# Patient Record
Sex: FEMALE | Race: WHITE | NOT HISPANIC OR LATINO
[De-identification: names, ages, dates, MRNs, and addresses within clinical notes are randomized per-mention and may not be internally consistent; named-entity substitution may affect disease eponyms.]

---

## 2017-06-08 ENCOUNTER — HOSPITAL ENCOUNTER (OUTPATIENT)
Dept: LAB | Age: 77
Setting detail: SPECIMEN
Discharge: HOME | End: 2017-06-08

## 2017-06-08 ENCOUNTER — RECORDS - HEALTHEAST (OUTPATIENT)
Dept: ADMINISTRATIVE | Facility: OTHER | Age: 77
End: 2017-06-08

## 2017-06-08 ENCOUNTER — RECORDS - HEALTHEAST (OUTPATIENT)
Dept: LAB | Facility: CLINIC | Age: 77
End: 2017-06-08

## 2017-06-08 LAB — CEA SERPL-MCNC: 4.4 NG/ML (ref 0–3)

## 2017-06-09 ENCOUNTER — COMMUNICATION - HEALTHEAST (OUTPATIENT)
Dept: ONCOLOGY | Facility: HOSPITAL | Age: 77
End: 2017-06-09

## 2018-01-29 ENCOUNTER — RECORDS - HEALTHEAST (OUTPATIENT)
Dept: LAB | Facility: CLINIC | Age: 78
End: 2018-01-29

## 2018-01-30 LAB
ALBUMIN SERPL-MCNC: 3.5 G/DL (ref 3.5–5)
ALP SERPL-CCNC: 106 U/L (ref 45–120)
ALT SERPL W P-5'-P-CCNC: 11 U/L (ref 0–45)
ANION GAP SERPL CALCULATED.3IONS-SCNC: 14 MMOL/L (ref 5–18)
AST SERPL W P-5'-P-CCNC: 12 U/L (ref 0–40)
BILIRUB SERPL-MCNC: 0.2 MG/DL (ref 0–1)
BUN SERPL-MCNC: 23 MG/DL (ref 8–28)
CALCIUM SERPL-MCNC: 9 MG/DL (ref 8.5–10.5)
CHLORIDE BLD-SCNC: 105 MMOL/L (ref 98–107)
CHOLEST SERPL-MCNC: 203 MG/DL
CO2 SERPL-SCNC: 19 MMOL/L (ref 22–31)
CREAT SERPL-MCNC: 1.41 MG/DL (ref 0.6–1.1)
FASTING STATUS PATIENT QL REPORTED: ABNORMAL
GFR SERPL CREATININE-BSD FRML MDRD: 36 ML/MIN/1.73M2
GLUCOSE BLD-MCNC: 202 MG/DL (ref 70–125)
HDLC SERPL-MCNC: 54 MG/DL
LDLC SERPL CALC-MCNC: 92 MG/DL
POTASSIUM BLD-SCNC: 4 MMOL/L (ref 3.5–5)
PROT SERPL-MCNC: 7.6 G/DL (ref 6–8)
SODIUM SERPL-SCNC: 138 MMOL/L (ref 136–145)
TRIGL SERPL-MCNC: 287 MG/DL
VIT B12 SERPL-MCNC: 645 PG/ML (ref 213–816)

## 2018-07-05 ENCOUNTER — RECORDS - HEALTHEAST (OUTPATIENT)
Dept: LAB | Facility: CLINIC | Age: 78
End: 2018-07-05

## 2018-07-05 LAB
ALBUMIN SERPL-MCNC: 3.6 G/DL (ref 3.5–5)
ALP SERPL-CCNC: 100 U/L (ref 45–120)
ALT SERPL W P-5'-P-CCNC: <9 U/L (ref 0–45)
ANION GAP SERPL CALCULATED.3IONS-SCNC: 13 MMOL/L (ref 5–18)
AST SERPL W P-5'-P-CCNC: 13 U/L (ref 0–40)
BILIRUB SERPL-MCNC: 0.4 MG/DL (ref 0–1)
BUN SERPL-MCNC: 24 MG/DL (ref 8–28)
CALCIUM SERPL-MCNC: 9.4 MG/DL (ref 8.5–10.5)
CHLORIDE BLD-SCNC: 108 MMOL/L (ref 98–107)
CO2 SERPL-SCNC: 19 MMOL/L (ref 22–31)
CREAT SERPL-MCNC: 1.32 MG/DL (ref 0.6–1.1)
GFR SERPL CREATININE-BSD FRML MDRD: 39 ML/MIN/1.73M2
GLUCOSE BLD-MCNC: 158 MG/DL (ref 70–125)
POTASSIUM BLD-SCNC: 4.8 MMOL/L (ref 3.5–5)
PROT SERPL-MCNC: 7.3 G/DL (ref 6–8)
SODIUM SERPL-SCNC: 140 MMOL/L (ref 136–145)
TSH SERPL DL<=0.005 MIU/L-ACNC: 2.07 UIU/ML (ref 0.3–5)

## 2018-07-09 ENCOUNTER — RECORDS - HEALTHEAST (OUTPATIENT)
Dept: LAB | Facility: CLINIC | Age: 78
End: 2018-07-09

## 2018-07-10 ENCOUNTER — AMBULATORY - HEALTHEAST (OUTPATIENT)
Dept: ONCOLOGY | Facility: HOSPITAL | Age: 78
End: 2018-07-10

## 2018-07-10 ENCOUNTER — COMMUNICATION - HEALTHEAST (OUTPATIENT)
Dept: ONCOLOGY | Facility: HOSPITAL | Age: 78
End: 2018-07-10

## 2018-07-10 DIAGNOSIS — C18.9 COLON CANCER (H): ICD-10-CM

## 2018-07-10 LAB
BASOPHILS # BLD AUTO: 0 THOU/UL (ref 0–0.2)
BASOPHILS NFR BLD AUTO: 0 % (ref 0–2)
EOSINOPHIL # BLD AUTO: 0 THOU/UL (ref 0–0.4)
EOSINOPHIL NFR BLD AUTO: 0 % (ref 0–6)
ERYTHROCYTE [DISTWIDTH] IN BLOOD BY AUTOMATED COUNT: 13.2 % (ref 11–14.5)
HCT VFR BLD AUTO: 34.8 % (ref 35–47)
HGB BLD-MCNC: 11.1 G/DL (ref 12–16)
LYMPHOCYTES # BLD AUTO: 1.3 THOU/UL (ref 0.8–4.4)
LYMPHOCYTES NFR BLD AUTO: 7 % (ref 20–40)
MCH RBC QN AUTO: 31 PG (ref 27–34)
MCHC RBC AUTO-ENTMCNC: 31.9 G/DL (ref 32–36)
MCV RBC AUTO: 97 FL (ref 80–100)
MONOCYTES # BLD AUTO: 0.5 THOU/UL (ref 0–0.9)
MONOCYTES NFR BLD AUTO: 2 % (ref 2–10)
NEUTROPHILS # BLD AUTO: 17.5 THOU/UL (ref 2–7.7)
NEUTROPHILS NFR BLD AUTO: 91 % (ref 50–70)
PLATELET # BLD AUTO: 250 THOU/UL (ref 140–440)
PMV BLD AUTO: 12.4 FL (ref 8.5–12.5)
RBC # BLD AUTO: 3.58 MILL/UL (ref 3.8–5.4)
WBC: 19.4 THOU/UL (ref 4–11)

## 2018-07-24 ENCOUNTER — RECORDS - HEALTHEAST (OUTPATIENT)
Dept: LAB | Facility: CLINIC | Age: 78
End: 2018-07-24

## 2018-07-25 LAB
IRON SATN MFR SERPL: 18 % (ref 20–50)
IRON SERPL-MCNC: 54 UG/DL (ref 42–175)
TIBC SERPL-MCNC: 304 UG/DL (ref 313–563)
TRANSFERRIN SERPL-MCNC: 243 MG/DL (ref 212–360)

## 2018-07-26 LAB
BASOPHILS # BLD AUTO: 0 THOU/UL (ref 0–0.2)
BASOPHILS NFR BLD AUTO: 0 % (ref 0–2)
EOSINOPHIL COUNT (ABSOLUTE): 0 THOU/UL (ref 0–0.4)
EOSINOPHIL NFR BLD AUTO: 0 % (ref 0–6)
ERYTHROCYTE [DISTWIDTH] IN BLOOD BY AUTOMATED COUNT: 13.7 % (ref 11–14.5)
HCT VFR BLD AUTO: 36.6 % (ref 35–47)
HGB BLD-MCNC: 11.1 G/DL (ref 12–16)
LAB AP CHARGES (HE HISTORICAL CONVERSION): NORMAL
LYMPHOCYTES # BLD AUTO: 1.9 THOU/UL (ref 0.8–4.4)
LYMPHOCYTES NFR BLD AUTO: 8 % (ref 20–40)
MCH RBC QN AUTO: 30.3 PG (ref 27–34)
MCHC RBC AUTO-ENTMCNC: 30.3 G/DL (ref 32–36)
MCV RBC AUTO: 100 FL (ref 80–100)
MONOCYTES # BLD AUTO: 1.2 THOU/UL (ref 0–0.9)
MONOCYTES NFR BLD AUTO: 5 % (ref 2–10)
PATH REPORT.COMMENTS IMP SPEC: NORMAL
PATH REPORT.COMMENTS IMP SPEC: NORMAL
PATH REPORT.FINAL DX SPEC: NORMAL
PATH REPORT.MICROSCOPIC SPEC OTHER STN: ABNORMAL
PATH REPORT.MICROSCOPIC SPEC OTHER STN: NORMAL
PATH REPORT.RELEVANT HX SPEC: NORMAL
PLAT MORPH BLD: NORMAL
PLATELET # BLD AUTO: 262 THOU/UL (ref 140–440)
PMV BLD AUTO: 11.8 FL (ref 8.5–12.5)
RBC # BLD AUTO: 3.66 MILL/UL (ref 3.8–5.4)
TOTAL NEUTROPHILS-ABS(DIFF): 20.8 THOU/UL (ref 2–7.7)
TOTAL NEUTROPHILS-REL(DIFF): 87 % (ref 50–70)
WBC: 23.9 THOU/UL (ref 4–11)

## 2018-08-03 ENCOUNTER — RECORDS - HEALTHEAST (OUTPATIENT)
Dept: ADMINISTRATIVE | Facility: OTHER | Age: 78
End: 2018-08-03

## 2018-10-25 ENCOUNTER — OFFICE VISIT - HEALTHEAST (OUTPATIENT)
Dept: GERIATRICS | Facility: CLINIC | Age: 78
End: 2018-10-25

## 2018-10-25 DIAGNOSIS — C18.9 MALIGNANT NEOPLASM OF COLON, UNSPECIFIED PART OF COLON (H): ICD-10-CM

## 2018-10-25 DIAGNOSIS — E08.00 DIABETES MELLITUS DUE TO UNDERLYING CONDITION WITH HYPEROSMOLARITY WITHOUT COMA, WITH LONG-TERM CURRENT USE OF INSULIN (H): ICD-10-CM

## 2018-10-25 DIAGNOSIS — R47.01 EXPRESSIVE APHASIA: ICD-10-CM

## 2018-10-25 DIAGNOSIS — D86.0 SARCOIDOSIS OF LUNG (H): ICD-10-CM

## 2018-10-25 DIAGNOSIS — N39.0 UTI (URINARY TRACT INFECTION), BACTERIAL: ICD-10-CM

## 2018-10-25 DIAGNOSIS — I10 ESSENTIAL HYPERTENSION: ICD-10-CM

## 2018-10-25 DIAGNOSIS — A49.9 UTI (URINARY TRACT INFECTION), BACTERIAL: ICD-10-CM

## 2018-10-25 DIAGNOSIS — Z79.4 DIABETES MELLITUS DUE TO UNDERLYING CONDITION WITH HYPEROSMOLARITY WITHOUT COMA, WITH LONG-TERM CURRENT USE OF INSULIN (H): ICD-10-CM

## 2018-10-25 DIAGNOSIS — E83.42 HYPOMAGNESEMIA: ICD-10-CM

## 2018-10-25 DIAGNOSIS — N17.9 AKI (ACUTE KIDNEY INJURY) (H): ICD-10-CM

## 2018-10-26 ENCOUNTER — RECORDS - HEALTHEAST (OUTPATIENT)
Dept: LAB | Facility: CLINIC | Age: 78
End: 2018-10-26

## 2018-10-26 LAB
ALBUMIN SERPL-MCNC: 2.8 G/DL (ref 3.5–5)
ALP SERPL-CCNC: 88 U/L (ref 45–120)
ALT SERPL W P-5'-P-CCNC: <9 U/L (ref 0–45)
ANION GAP SERPL CALCULATED.3IONS-SCNC: 8 MMOL/L (ref 5–18)
AST SERPL W P-5'-P-CCNC: 11 U/L (ref 0–40)
BILIRUB SERPL-MCNC: 0.4 MG/DL (ref 0–1)
BUN SERPL-MCNC: 16 MG/DL (ref 8–28)
CALCIUM SERPL-MCNC: 8.9 MG/DL (ref 8.5–10.5)
CHLORIDE BLD-SCNC: 104 MMOL/L (ref 98–107)
CO2 SERPL-SCNC: 27 MMOL/L (ref 22–31)
CREAT SERPL-MCNC: 1.03 MG/DL (ref 0.6–1.1)
GFR SERPL CREATININE-BSD FRML MDRD: 52 ML/MIN/1.73M2
GLUCOSE BLD-MCNC: 114 MG/DL (ref 70–125)
HGB BLD-MCNC: 9.2 G/DL (ref 12–16)
POTASSIUM BLD-SCNC: 3.8 MMOL/L (ref 3.5–5)
PROT SERPL-MCNC: 6.7 G/DL (ref 6–8)
SODIUM SERPL-SCNC: 139 MMOL/L (ref 136–145)

## 2018-10-30 ENCOUNTER — OFFICE VISIT - HEALTHEAST (OUTPATIENT)
Dept: GERIATRICS | Facility: CLINIC | Age: 78
End: 2018-10-30

## 2018-10-30 DIAGNOSIS — E83.42 HYPOMAGNESEMIA: ICD-10-CM

## 2018-10-30 DIAGNOSIS — R47.01 EXPRESSIVE APHASIA: ICD-10-CM

## 2018-10-30 DIAGNOSIS — I10 ESSENTIAL HYPERTENSION: ICD-10-CM

## 2018-10-30 DIAGNOSIS — D86.0 SARCOIDOSIS OF LUNG (H): ICD-10-CM

## 2018-10-30 DIAGNOSIS — A49.9 UTI (URINARY TRACT INFECTION), BACTERIAL: ICD-10-CM

## 2018-10-30 DIAGNOSIS — N39.0 UTI (URINARY TRACT INFECTION), BACTERIAL: ICD-10-CM

## 2018-10-30 DIAGNOSIS — Z79.4 DIABETES MELLITUS DUE TO UNDERLYING CONDITION WITH HYPEROSMOLARITY WITHOUT COMA, WITH LONG-TERM CURRENT USE OF INSULIN (H): ICD-10-CM

## 2018-10-30 DIAGNOSIS — N17.9 AKI (ACUTE KIDNEY INJURY) (H): ICD-10-CM

## 2018-10-30 DIAGNOSIS — C18.9 MALIGNANT NEOPLASM OF COLON, UNSPECIFIED PART OF COLON (H): ICD-10-CM

## 2018-10-30 DIAGNOSIS — E08.00 DIABETES MELLITUS DUE TO UNDERLYING CONDITION WITH HYPEROSMOLARITY WITHOUT COMA, WITH LONG-TERM CURRENT USE OF INSULIN (H): ICD-10-CM

## 2018-10-31 ENCOUNTER — HOME CARE/HOSPICE - HEALTHEAST (OUTPATIENT)
Dept: HOME HEALTH SERVICES | Facility: HOME HEALTH | Age: 78
End: 2018-10-31

## 2018-11-01 ENCOUNTER — OFFICE VISIT - HEALTHEAST (OUTPATIENT)
Dept: GERIATRICS | Facility: CLINIC | Age: 78
End: 2018-11-01

## 2018-11-01 DIAGNOSIS — I10 ESSENTIAL HYPERTENSION: ICD-10-CM

## 2018-11-01 DIAGNOSIS — A49.9 UTI (URINARY TRACT INFECTION), BACTERIAL: ICD-10-CM

## 2018-11-01 DIAGNOSIS — N39.0 UTI (URINARY TRACT INFECTION), BACTERIAL: ICD-10-CM

## 2018-11-01 DIAGNOSIS — Z79.4 DIABETES MELLITUS DUE TO UNDERLYING CONDITION WITH HYPEROSMOLARITY WITHOUT COMA, WITH LONG-TERM CURRENT USE OF INSULIN (H): ICD-10-CM

## 2018-11-01 DIAGNOSIS — R47.01 EXPRESSIVE APHASIA: ICD-10-CM

## 2018-11-01 DIAGNOSIS — E08.00 DIABETES MELLITUS DUE TO UNDERLYING CONDITION WITH HYPEROSMOLARITY WITHOUT COMA, WITH LONG-TERM CURRENT USE OF INSULIN (H): ICD-10-CM

## 2018-11-01 DIAGNOSIS — N17.9 AKI (ACUTE KIDNEY INJURY) (H): ICD-10-CM

## 2018-11-06 ENCOUNTER — OFFICE VISIT - HEALTHEAST (OUTPATIENT)
Dept: GERIATRICS | Facility: CLINIC | Age: 78
End: 2018-11-06

## 2018-11-06 DIAGNOSIS — I10 ESSENTIAL HYPERTENSION: ICD-10-CM

## 2018-11-06 DIAGNOSIS — Z79.4 DIABETES MELLITUS DUE TO UNDERLYING CONDITION WITH HYPEROSMOLARITY WITHOUT COMA, WITH LONG-TERM CURRENT USE OF INSULIN (H): ICD-10-CM

## 2018-11-06 DIAGNOSIS — E08.00 DIABETES MELLITUS DUE TO UNDERLYING CONDITION WITH HYPEROSMOLARITY WITHOUT COMA, WITH LONG-TERM CURRENT USE OF INSULIN (H): ICD-10-CM

## 2018-11-06 DIAGNOSIS — I63.9 CEREBELLAR INFARCT (H): ICD-10-CM

## 2018-11-06 DIAGNOSIS — R47.01 EXPRESSIVE APHASIA: ICD-10-CM

## 2018-11-06 DIAGNOSIS — N39.0 UTI (URINARY TRACT INFECTION), BACTERIAL: ICD-10-CM

## 2018-11-06 DIAGNOSIS — A49.9 UTI (URINARY TRACT INFECTION), BACTERIAL: ICD-10-CM

## 2018-11-08 ENCOUNTER — OFFICE VISIT - HEALTHEAST (OUTPATIENT)
Dept: GERIATRICS | Facility: CLINIC | Age: 78
End: 2018-11-08

## 2018-11-08 DIAGNOSIS — R47.01 EXPRESSIVE APHASIA: ICD-10-CM

## 2018-11-08 DIAGNOSIS — E08.00 DIABETES MELLITUS DUE TO UNDERLYING CONDITION WITH HYPEROSMOLARITY WITHOUT COMA, WITH LONG-TERM CURRENT USE OF INSULIN (H): ICD-10-CM

## 2018-11-08 DIAGNOSIS — I10 ESSENTIAL HYPERTENSION: ICD-10-CM

## 2018-11-08 DIAGNOSIS — Z79.4 DIABETES MELLITUS DUE TO UNDERLYING CONDITION WITH HYPEROSMOLARITY WITHOUT COMA, WITH LONG-TERM CURRENT USE OF INSULIN (H): ICD-10-CM

## 2018-11-08 DIAGNOSIS — A49.9 UTI (URINARY TRACT INFECTION), BACTERIAL: ICD-10-CM

## 2018-11-08 DIAGNOSIS — N39.0 UTI (URINARY TRACT INFECTION), BACTERIAL: ICD-10-CM

## 2018-11-13 ENCOUNTER — OFFICE VISIT - HEALTHEAST (OUTPATIENT)
Dept: GERIATRICS | Facility: CLINIC | Age: 78
End: 2018-11-13

## 2018-11-13 DIAGNOSIS — I63.9 CEREBELLAR INFARCT (H): ICD-10-CM

## 2018-11-13 DIAGNOSIS — N17.9 AKI (ACUTE KIDNEY INJURY) (H): ICD-10-CM

## 2018-11-13 DIAGNOSIS — R47.01 EXPRESSIVE APHASIA: ICD-10-CM

## 2018-11-13 DIAGNOSIS — A49.9 UTI (URINARY TRACT INFECTION), BACTERIAL: ICD-10-CM

## 2018-11-13 DIAGNOSIS — N39.0 UTI (URINARY TRACT INFECTION), BACTERIAL: ICD-10-CM

## 2018-11-13 DIAGNOSIS — Z79.4 DIABETES MELLITUS DUE TO UNDERLYING CONDITION WITH HYPEROSMOLARITY WITHOUT COMA, WITH LONG-TERM CURRENT USE OF INSULIN (H): ICD-10-CM

## 2018-11-13 DIAGNOSIS — E08.00 DIABETES MELLITUS DUE TO UNDERLYING CONDITION WITH HYPEROSMOLARITY WITHOUT COMA, WITH LONG-TERM CURRENT USE OF INSULIN (H): ICD-10-CM

## 2018-11-13 DIAGNOSIS — D86.0 SARCOIDOSIS OF LUNG (H): ICD-10-CM

## 2018-11-15 ENCOUNTER — OFFICE VISIT - HEALTHEAST (OUTPATIENT)
Dept: GERIATRICS | Facility: CLINIC | Age: 78
End: 2018-11-15

## 2018-11-15 DIAGNOSIS — I63.9 CEREBELLAR INFARCT (H): ICD-10-CM

## 2018-11-15 DIAGNOSIS — N39.0 UTI (URINARY TRACT INFECTION), BACTERIAL: ICD-10-CM

## 2018-11-15 DIAGNOSIS — D86.0 SARCOIDOSIS OF LUNG (H): ICD-10-CM

## 2018-11-15 DIAGNOSIS — Z79.4 DIABETES MELLITUS DUE TO UNDERLYING CONDITION WITH HYPEROSMOLARITY WITHOUT COMA, WITH LONG-TERM CURRENT USE OF INSULIN (H): ICD-10-CM

## 2018-11-15 DIAGNOSIS — R47.01 EXPRESSIVE APHASIA: ICD-10-CM

## 2018-11-15 DIAGNOSIS — I10 ESSENTIAL HYPERTENSION: ICD-10-CM

## 2018-11-15 DIAGNOSIS — A49.9 UTI (URINARY TRACT INFECTION), BACTERIAL: ICD-10-CM

## 2018-11-15 DIAGNOSIS — E08.00 DIABETES MELLITUS DUE TO UNDERLYING CONDITION WITH HYPEROSMOLARITY WITHOUT COMA, WITH LONG-TERM CURRENT USE OF INSULIN (H): ICD-10-CM

## 2018-11-16 ENCOUNTER — AMBULATORY - HEALTHEAST (OUTPATIENT)
Dept: GERIATRICS | Facility: CLINIC | Age: 78
End: 2018-11-16

## 2018-11-19 ENCOUNTER — RECORDS - HEALTHEAST (OUTPATIENT)
Dept: LAB | Facility: CLINIC | Age: 78
End: 2018-11-19

## 2018-11-19 LAB
ANION GAP SERPL CALCULATED.3IONS-SCNC: 16 MMOL/L (ref 5–18)
BUN SERPL-MCNC: 21 MG/DL (ref 8–28)
CALCIUM SERPL-MCNC: 9 MG/DL (ref 8.5–10.5)
CHLORIDE BLD-SCNC: 105 MMOL/L (ref 98–107)
CO2 SERPL-SCNC: 21 MMOL/L (ref 22–31)
CREAT SERPL-MCNC: 1.31 MG/DL (ref 0.6–1.1)
GFR SERPL CREATININE-BSD FRML MDRD: 39 ML/MIN/1.73M2
GLUCOSE BLD-MCNC: 167 MG/DL (ref 70–125)
MAGNESIUM SERPL-MCNC: 1.8 MG/DL (ref 1.8–2.6)
POTASSIUM BLD-SCNC: 3.9 MMOL/L (ref 3.5–5)
SODIUM SERPL-SCNC: 142 MMOL/L (ref 136–145)

## 2018-12-10 ENCOUNTER — RECORDS - HEALTHEAST (OUTPATIENT)
Dept: LAB | Facility: CLINIC | Age: 78
End: 2018-12-10

## 2018-12-11 LAB — BACTERIA SPEC CULT: NO GROWTH

## 2019-01-07 ENCOUNTER — RECORDS - HEALTHEAST (OUTPATIENT)
Dept: LAB | Facility: CLINIC | Age: 79
End: 2019-01-07

## 2019-01-07 LAB
ANION GAP SERPL CALCULATED.3IONS-SCNC: 16 MMOL/L (ref 5–18)
BUN SERPL-MCNC: 19 MG/DL (ref 8–28)
CALCIUM SERPL-MCNC: 9.1 MG/DL (ref 8.5–10.5)
CHLORIDE BLD-SCNC: 103 MMOL/L (ref 98–107)
CO2 SERPL-SCNC: 19 MMOL/L (ref 22–31)
CREAT SERPL-MCNC: 1.5 MG/DL (ref 0.6–1.1)
GFR SERPL CREATININE-BSD FRML MDRD: 34 ML/MIN/1.73M2
GLUCOSE BLD-MCNC: 186 MG/DL (ref 70–125)
POTASSIUM BLD-SCNC: 4.6 MMOL/L (ref 3.5–5)
SODIUM SERPL-SCNC: 138 MMOL/L (ref 136–145)

## 2019-04-17 ENCOUNTER — RECORDS - HEALTHEAST (OUTPATIENT)
Dept: LAB | Facility: CLINIC | Age: 79
End: 2019-04-17

## 2019-04-17 LAB
ANION GAP SERPL CALCULATED.3IONS-SCNC: 15 MMOL/L (ref 5–18)
BUN SERPL-MCNC: 25 MG/DL (ref 8–28)
CALCIUM SERPL-MCNC: 9.2 MG/DL (ref 8.5–10.5)
CHLORIDE BLD-SCNC: 101 MMOL/L (ref 98–107)
CO2 SERPL-SCNC: 19 MMOL/L (ref 22–31)
CREAT SERPL-MCNC: 1.28 MG/DL (ref 0.6–1.1)
GFR SERPL CREATININE-BSD FRML MDRD: 40 ML/MIN/1.73M2
GLUCOSE BLD-MCNC: 152 MG/DL (ref 70–125)
POTASSIUM BLD-SCNC: 3.8 MMOL/L (ref 3.5–5)
SODIUM SERPL-SCNC: 135 MMOL/L (ref 136–145)

## 2019-04-18 LAB — BACTERIA SPEC CULT: NO GROWTH

## 2019-08-30 ENCOUNTER — RECORDS - HEALTHEAST (OUTPATIENT)
Dept: LAB | Facility: CLINIC | Age: 79
End: 2019-08-30

## 2019-08-31 LAB — BACTERIA SPEC CULT: NO GROWTH

## 2019-10-04 ENCOUNTER — RECORDS - HEALTHEAST (OUTPATIENT)
Dept: LAB | Facility: CLINIC | Age: 79
End: 2019-10-04

## 2019-10-04 LAB
ANION GAP SERPL CALCULATED.3IONS-SCNC: 12 MMOL/L (ref 5–18)
BUN SERPL-MCNC: 19 MG/DL (ref 8–28)
CALCIUM SERPL-MCNC: 9 MG/DL (ref 8.5–10.5)
CHLORIDE BLD-SCNC: 108 MMOL/L (ref 98–107)
CO2 SERPL-SCNC: 22 MMOL/L (ref 22–31)
CREAT SERPL-MCNC: 1.23 MG/DL (ref 0.6–1.1)
GFR SERPL CREATININE-BSD FRML MDRD: 42 ML/MIN/1.73M2
GLUCOSE BLD-MCNC: 185 MG/DL (ref 70–125)
POTASSIUM BLD-SCNC: 3.2 MMOL/L (ref 3.5–5)
SODIUM SERPL-SCNC: 142 MMOL/L (ref 136–145)

## 2019-10-09 ENCOUNTER — RECORDS - HEALTHEAST (OUTPATIENT)
Dept: LAB | Facility: CLINIC | Age: 79
End: 2019-10-09

## 2019-10-10 LAB — BACTERIA SPEC CULT: NO GROWTH

## 2019-12-16 ENCOUNTER — RECORDS - HEALTHEAST (OUTPATIENT)
Dept: LAB | Facility: CLINIC | Age: 79
End: 2019-12-16

## 2019-12-17 LAB
ALBUMIN SERPL-MCNC: 3.1 G/DL (ref 3.5–5)
ALP SERPL-CCNC: 95 U/L (ref 45–120)
ALT SERPL W P-5'-P-CCNC: <9 U/L (ref 0–45)
ANION GAP SERPL CALCULATED.3IONS-SCNC: 12 MMOL/L (ref 5–18)
AST SERPL W P-5'-P-CCNC: 11 U/L (ref 0–40)
BILIRUB SERPL-MCNC: 0.4 MG/DL (ref 0–1)
BUN SERPL-MCNC: 14 MG/DL (ref 8–28)
CALCIUM SERPL-MCNC: 8.7 MG/DL (ref 8.5–10.5)
CHLORIDE BLD-SCNC: 105 MMOL/L (ref 98–107)
CO2 SERPL-SCNC: 24 MMOL/L (ref 22–31)
CREAT SERPL-MCNC: 1.3 MG/DL (ref 0.6–1.1)
ERYTHROCYTE [DISTWIDTH] IN BLOOD BY AUTOMATED COUNT: 13.2 % (ref 11–14.5)
GFR SERPL CREATININE-BSD FRML MDRD: 40 ML/MIN/1.73M2
GLUCOSE BLD-MCNC: 122 MG/DL (ref 70–125)
HCT VFR BLD AUTO: 33 % (ref 35–47)
HGB BLD-MCNC: 10.4 G/DL (ref 12–16)
IRON SATN MFR SERPL: 10 % (ref 20–50)
IRON SERPL-MCNC: 26 UG/DL (ref 42–175)
MCH RBC QN AUTO: 30.1 PG (ref 27–34)
MCHC RBC AUTO-ENTMCNC: 31.5 G/DL (ref 32–36)
MCV RBC AUTO: 96 FL (ref 80–100)
PLATELET # BLD AUTO: 200 THOU/UL (ref 140–440)
PMV BLD AUTO: 12 FL (ref 8.5–12.5)
POTASSIUM BLD-SCNC: 3.4 MMOL/L (ref 3.5–5)
PROT SERPL-MCNC: 7 G/DL (ref 6–8)
RBC # BLD AUTO: 3.45 MILL/UL (ref 3.8–5.4)
SODIUM SERPL-SCNC: 141 MMOL/L (ref 136–145)
TIBC SERPL-MCNC: 262 UG/DL (ref 313–563)
TRANSFERRIN SERPL-MCNC: 210 MG/DL (ref 212–360)
WBC: 20.3 THOU/UL (ref 4–11)

## 2020-02-26 ENCOUNTER — RECORDS - HEALTHEAST (OUTPATIENT)
Dept: LAB | Facility: CLINIC | Age: 80
End: 2020-02-26

## 2020-02-29 LAB — BACTERIA SPEC CULT: ABNORMAL

## 2020-03-02 ENCOUNTER — HOME CARE/HOSPICE - HEALTHEAST (OUTPATIENT)
Dept: HOSPICE | Facility: HOSPICE | Age: 80
End: 2020-03-02

## 2020-03-03 ENCOUNTER — HOME CARE/HOSPICE - HEALTHEAST (OUTPATIENT)
Dept: HOSPICE | Facility: HOSPICE | Age: 80
End: 2020-03-03

## 2020-03-06 ENCOUNTER — HOME CARE/HOSPICE - HEALTHEAST (OUTPATIENT)
Dept: HOSPICE | Facility: HOSPICE | Age: 80
End: 2020-03-06

## 2020-03-06 ENCOUNTER — COMMUNICATION - HEALTHEAST (OUTPATIENT)
Dept: HOSPICE | Facility: HOSPICE | Age: 80
End: 2020-03-06

## 2020-03-09 ENCOUNTER — HOME CARE/HOSPICE - HEALTHEAST (OUTPATIENT)
Dept: HOSPICE | Facility: HOSPICE | Age: 80
End: 2020-03-09

## 2020-03-10 ENCOUNTER — RECORDS - HEALTHEAST (OUTPATIENT)
Dept: LAB | Facility: CLINIC | Age: 80
End: 2020-03-10

## 2020-03-12 LAB — BACTERIA SPEC CULT: NORMAL

## 2021-05-24 ENCOUNTER — RECORDS - HEALTHEAST (OUTPATIENT)
Dept: ADMINISTRATIVE | Facility: CLINIC | Age: 81
End: 2021-05-24

## 2021-05-25 ENCOUNTER — RECORDS - HEALTHEAST (OUTPATIENT)
Dept: ADMINISTRATIVE | Facility: CLINIC | Age: 81
End: 2021-05-25

## 2021-05-26 ENCOUNTER — RECORDS - HEALTHEAST (OUTPATIENT)
Dept: ADMINISTRATIVE | Facility: CLINIC | Age: 81
End: 2021-05-26

## 2021-05-27 ENCOUNTER — RECORDS - HEALTHEAST (OUTPATIENT)
Dept: ADMINISTRATIVE | Facility: CLINIC | Age: 81
End: 2021-05-27

## 2021-05-28 ENCOUNTER — RECORDS - HEALTHEAST (OUTPATIENT)
Dept: ADMINISTRATIVE | Facility: CLINIC | Age: 81
End: 2021-05-28

## 2021-05-29 ENCOUNTER — RECORDS - HEALTHEAST (OUTPATIENT)
Dept: ADMINISTRATIVE | Facility: CLINIC | Age: 81
End: 2021-05-29

## 2021-05-30 ENCOUNTER — RECORDS - HEALTHEAST (OUTPATIENT)
Dept: ADMINISTRATIVE | Facility: CLINIC | Age: 81
End: 2021-05-30

## 2021-05-31 ENCOUNTER — RECORDS - HEALTHEAST (OUTPATIENT)
Dept: ADMINISTRATIVE | Facility: CLINIC | Age: 81
End: 2021-05-31

## 2021-06-01 ENCOUNTER — RECORDS - HEALTHEAST (OUTPATIENT)
Dept: ADMINISTRATIVE | Facility: CLINIC | Age: 81
End: 2021-06-01

## 2021-06-02 ENCOUNTER — RECORDS - HEALTHEAST (OUTPATIENT)
Dept: ADMINISTRATIVE | Facility: CLINIC | Age: 81
End: 2021-06-02

## 2021-06-02 VITALS — BODY MASS INDEX: 24.02 KG/M2 | WEIGHT: 123 LBS

## 2021-06-16 PROBLEM — D72.829 LEUKOCYTOSIS: Status: ACTIVE | Noted: 2018-10-20

## 2021-06-16 PROBLEM — R47.01 EXPRESSIVE APHASIA: Status: ACTIVE | Noted: 2018-10-20

## 2021-06-16 PROBLEM — E87.20 LACTIC ACIDOSIS: Status: ACTIVE | Noted: 2018-10-20

## 2021-06-21 NOTE — PROGRESS NOTES
Code Status:  FULL CODE  Visit Type: Problem Visit     Facility:  [facility] [[id number]]         Facility Type: SNF (Skilled Nursing Facility, TCU)    History of Present Illness: Paola Valdez is a 78 y.o. female seen today for follow-up on the TCU.  Patient with with hx of sarcoidosis, on chronic prednisone, hx of colon cancer DM, HTN, lipids, CKD stage 3, presents with acute aphashia, however over past 2 weeks weakness and intermit epoisodes of confusion as well.  Patient hospitalized on 10/20/2018 with acute confusional state with intermittent dysarthria.  MRI of the brain was negative for acute stroke however it did show chronic infarct in the cerebellar area.  EEG G results reviewed.  Neurology recommended no seizure medications.  Confusion did improve with IV antibiotics and IV fluids.  She was discharged on p.o. Keflex.  She did have a positive blood culture however it was thought that this could be contaminant for skin.  She was monitored for aspiration.  Serial troponins negative.  Magnesium was replaced.  She continues on aspirin and statin for stroke prophylaxis.  She does have underlying dementia.  Recent cataract surgery on the right eye on 9/24 on the left eye 10/8.      Patient sitting up in bedside chair.  She is eating well.  Bowels are moving regularly.  No dysuria.  She continues with expressive aphasia.  She does continue on speech therapy.              Active Ambulatory Problems     Diagnosis Date Noted     Hypertension      Diabetes mellitus (H)      Iron deficiency anemia due to chronic blood loss 01/06/2016     Sarcoidosis of lung (H)      Malnutrition of moderate degree (H) 01/13/2016     Colon cancer (H) 01/29/2016     Expressive aphasia 10/20/2018     Lactic acidosis 10/20/2018     Leukocytosis 10/20/2018     Acute confusion      Hypomagnesemia      UTI (urinary tract infection), bacterial      COLLIN (acute kidney injury) (H)      Acute cystitis without hematuria       Resolved Ambulatory Problems     Diagnosis Date Noted     Hypokalemia 01/06/2016     Hyperglycemia 01/21/2016     Past Medical History:   Diagnosis Date     Cataracts, bilateral      Diabetes mellitus (H)      Gallstone      [place] (hard of hearing), bilateral      Hyperlipemia      Hypertension      Iron deficiency anemia secondary to blood loss (chronic)      Pneumonia      Sarcoidosis of lung (H)        Current Outpatient Prescriptions   Medication Sig     acetaminophen (TYLENOL) 500 MG tablet Take 1 tablet (500 mg total) by mouth every 4 (four) hours as needed for pain or fever.     aspirin 81 MG EC tablet Take 1 tablet (81 mg total) by mouth daily.     escitalopram oxalate (LEXAPRO) 10 MG tablet Take 10 mg by mouth daily.     losartan (COZAAR) 50 MG tablet Take 50 mg by mouth daily. Indications: Hypertension     metFORMIN (GLUCOPHAGE) 500 MG tablet Take 1 tablet (500 mg total) by mouth 2 (two) times a day with meals.     metoprolol succinate (TOPROL-XL) 50 MG 24 hr tablet Take 100 mg by mouth daily.      prednisoLONE acetate (PRED-FORTE) 1 % ophthalmic suspension Administer 1 drop into the left eye 4 (four) times a day.      predniSONE (DELTASONE) 20 MG tablet Take 20 mg by mouth every other day. For lungs     simvastatin (ZOCOR) 20 MG tablet Take 1 tablet (20 mg total) by mouth at bedtime.     sitaGLIPtin (JANUVIA) 50 MG tablet Take 1 tablet (50 mg total) by mouth every other day. Given along with prednisone     traZODone (DESYREL) 50 MG tablet Take 0.5 tablets (25 mg total) by mouth at bedtime as needed for sleep (Sundowning/agitation).       No Known Allergies      Review of Systems   Patient poor historian secondary to advanced dementia and expressive aphasia.  Most information obtained from nursing staff.    Physical Exam   PHYSICAL EXAMINATION:  Vital signs: /82, heart rate 82, respirations 20, temperature 98.3, O2 sat 99%.  Weight 122.6 pounds.  General: Awake, Alert, oriented x1, appropriately,  follows simple commands, quiet on exam.   HEENT:PERRLA, Pink conjunctiva, anicteric sclerae, moist oral mucosa.  Poor dentition.  Facial symmetry.  Tongue is midline.  NECK: Supple, without any lymphadenopathy, or masses  CVS:  S1  S2, without murmur or gallop.   LUNG: Clear to auscultation, No wheezes, rales or rhonci.  BACK: No kyphosis of the thoracic spine  ABDOMEN: Soft, nontender to palpation, with positive bowel sounds  EXTREMITIES: Good range of motion on both upper and lower extremities, no pedal edema, no calf tenderness.   are equal.  She is able to overcome gravity in all 4 extremities.  SKIN: Warm and dry, no rashes or erythema noted  NEUROLOGIC: Intact, pulses palpable  PSYCHIATRIC: Cognitive impairment with expressive aphasia.            Labs:    Labs reviewed in the record.    Assessment/Plan:  1. Expressive aphasia     2. Diabetes mellitus due to underlying condition with hyperosmolarity without coma, with long-term current use of insulin (H)     3. Cerebellar infarct (H)     4. UTI (urinary tract infection), bacterial     5. Essential hypertension       Patient with recent hospitalization with UTI old cerebellar infarct.  She is ongoing with speech therapy secondary to expressive aphasia and cognitive impairment.  She is eating regular diet.  No dysphagia.  She is completed all antibiotic.  Voiding without difficulty.  Hypertension.  Suspect controlled.  Diabetes.  Occasional elevated blood sugar in the 200s.  She is steroid-dependent.  Patient lives with family.      Electronically signed by: Tatianna Mackenzie CNP

## 2021-06-21 NOTE — PROGRESS NOTES
[facility]    Facility:   [facility] [[id number]]   Code Status: FULL CODE      CHIEF COMPLAINT/REASON FOR VISIT:  Chief Complaint   Patient presents with     H & P     UTI, acute on chronic cognitive imparment, COLLIN, Hypomagnesia, Sarcoidosis(chronic prednisone), DM2, HTN, HLD, CKD stage 3, aphasia, dysphagia       HISTORY:      HPI: Paola is a 78 y.o. female with a hx of sarcoidosis on chronic prednisone, hx of colon cancer (treated in remission), DM, HTN, lipids, CKD stage 3, was recently hospitalized from 10/20 to 10/24/2018 due to new onset aphasia and increased confusion.  CT done on 10/20 showed no stroke or neck vessel stenosis.Troponins were negative and showed low voltage QRS on EKG but the [facility] was consulted for changes on EEG but was determined that anti-seizure medications were not needed.  CT of the lung showed fibrotic and emphysematous changes.  She was found to have a UTI which was treated with fluids and keflex and showed  improvement. She was discharged on oral Keflex to end on 10/27/2018. Her hospitalization was complicated by hypomagnesia thought to be possibly to DM vs GI loss.  She was put on sitagliptin along with her home metformin to better manage sugars on prednisone.     Today, she reports feeling better but not quite to her baseline.  Pertaining to her UTI, she denies any urination frequency, urgency, incontinence or burning.  She will continue on Keflex until 10/27.  She states her aphasia is improving but not at baseline.  She does have slow speech and some cognitive deficit.  She reports she lives with her son and grand daughter and they are able to help her once stronger. She does have orthostatic BPs with lying 139/80, sitting 127/84, standing 94/73.  Most like autonomic due to sarcoidosis.  She denies any dizziness or lightheadness during visit but does state she has these symptoms upon sitting up or standing.  She takes metoprolol  and cozaar on home dosing.  BPs were typically in the 140s/70s in the hospital. Pertaining to her DM her sugars are well managed 140s-160s.  She denies any hyperglycemia symptoms.  Her magnesium on 10/23 was 1.6 and was supplemented.  She also has anemia with a hgb on 10/23 at 8.8.  She is continuing to take simvastatin from home dose.      She had cataract surgery on right eye on 9/24 and left eye on 10/8.  Patient wondering when she stop the prednisinolone gttps.  Spoke with eye Dr. Sarahi Rae and she is to continue drops in left eye until bottle gone.  Updated nursing staff.     Past Medical History:   Diagnosis Date     Cataracts, bilateral      Diabetes mellitus (H)     type 2     Gallstone      [place] (hard of hearing), bilateral      Hyperlipemia      Hypertension      Iron deficiency anemia secondary to blood loss (chronic)      Pneumonia      Sarcoidosis of lung (H)              Family History   Problem Relation Age of Onset     No Medical Problems Mother      Early death Father      Social History     Social History     Marital status:      Spouse name: N/A     Number of children: N/A     Years of education: N/A     Social History Main Topics     Smoking status: Never Smoker     Smokeless tobacco: Not on file     Alcohol use No     Drug use: No     Sexual activity: No     Other Topics Concern     Not on file     Social History Narrative         Review of Systems   Constitutional:        Denies fever, chills, increased fatigue, weakness, denies pain   HENT: Negative for congestion.    Eyes: Negative for pain, redness and visual disturbance.   Respiratory: Positive for wheezing. Negative for apnea, cough, chest tightness and shortness of breath.    Cardiovascular: Negative for chest pain.   Gastrointestinal: Negative for abdominal distention and abdominal pain.   Endocrine: Negative for cold intolerance, heat intolerance and polyuria.   Genitourinary: Negative for difficulty urinating, dysuria and  frequency.   Neurological: Negative for light-headedness, numbness and headaches.       .  Vitals:    10/25/18 1057   BP: 142/74   Pulse: 94   Resp: 20   Temp: 99.4  F (37.4  C)   SpO2: 94%   Weight: 123 lb (55.8 kg)       Physical Exam   Constitutional: She appears well-developed and well-nourished. She appears distressed.   HENT:   Head: Normocephalic and atraumatic.   Eyes: EOM are normal. Pupils are equal, round, and reactive to light. Right eye exhibits no discharge. Left eye exhibits no discharge.   Neck: Normal range of motion. No thyromegaly present.   Cardiovascular:   Irregular rhythm but regular rate. Has slight murmur at LSB. No S3, S4 present   Pulmonary/Chest: Effort normal. No respiratory distress.   Patient has expiratory wheeze on right LL intermittently due to sarcoidosis.  She states this is chronic.    Musculoskeletal: Normal range of motion. She exhibits no edema.   Neurological: She is alert.   Appears to be a cognitive deficit   Skin: Skin is warm and dry.   Psychiatric:   Cognitive processing appears slow         LABS:   Recent Results (from the past 240 hour(s))   POCT Glucose   Result Value Ref Range    Glucose,  mg/dL   INR   Result Value Ref Range    INR 1.00 0.90 - 1.10   APTT(PTT)   Result Value Ref Range    PTT 29 24 - 37 seconds   Basic Metabolic Panel   Result Value Ref Range    Sodium 138 136 - 145 mmol/L    Potassium 3.5 3.5 - 5.0 mmol/L    Chloride 100 98 - 107 mmol/L    CO2 24 22 - 31 mmol/L    Anion Gap, Calculation 14 5 - 18 mmol/L    Glucose 341 (H) 70 - 125 mg/dL    Calcium 9.3 8.5 - 10.5 mg/dL    BUN 18 8 - 28 mg/dL    Creatinine 1.40 (H) 0.60 - 1.10 mg/dL    GFR MDRD Af Amer 44 (L) >60 mL/min/1.73m2    GFR MDRD Non Af Amer 36 (L) >60 mL/min/1.73m2   HM2(CBC w/o Differential)   Result Value Ref Range    WBC 16.1 (H) 4.0 - 11.0 thou/uL    RBC 3.73 (L) 3.80 - 5.40 mill/uL    Hemoglobin 11.2 (L) 12.0 - 16.0 g/dL    Hematocrit 34.6 (L) 35.0 - 47.0 %    MCV 93 80 - 100 fL     MCH 30.0 27.0 - 34.0 pg    MCHC 32.4 32.0 - 36.0 g/dL    RDW 13.8 11.0 - 14.5 %    Platelets 294 140 - 440 thou/uL    MPV 10.8 8.5 - 12.5 fL   POCT creatinine   Result Value Ref Range    POC Creatinine 1.2 mg/dL   Urinalysis-UC if Indicated   Result Value Ref Range    Color, UA Yellow Colorless, Yellow, Straw, Light Yellow    Clarity, UA Clear Clear    Glucose,  mg/dL (!) Negative    Bilirubin, UA Negative Negative    Ketones, UA Negative Negative, 60 mg/dL    Specific Gravity, UA 1.031 (H) 1.001 - 1.030    Blood, UA Small (!) Negative    pH, UA 5.5 4.5 - 8.0    Protein, UA 50 mg/dL (!) Negative mg/dL    Urobilinogen, UA <2.0 E.U./dL <2.0 E.U./dL, 2.0 E.U./dL    Nitrite, UA Negative Negative    Leukocytes, UA Large (!) Negative    Bacteria, UA Few (!) None Seen hpf    RBC, UA 3-5 (!) None Seen, 0-2 hpf    WBC, UA 25-50 (!) None Seen, 0-5 hpf    Squam Epithel, UA 10-25 (!) None Seen, 0-5 lpf    Mucus, UA Few (!) None Seen lpf    Hyaline Casts, UA 0-5 0-5, None Seen lpf   Culture, Urine   Result Value Ref Range    Culture 50,000-100,000 col/ml Escherichia coli (!)        Susceptibility    Escherichia coli - MIHIR     Amoxicillin + Clavulanate <=4/2 Sensitive      Ampicillin <=4 Sensitive      Cefazolin 2 Sensitive      Cefepime <=1 Sensitive      Ciprofloxacin <=0.5 Sensitive      Gentamicin <=2 Sensitive      Levofloxacin <=1 Sensitive      Meropenem <=0.25 Sensitive      Nitrofurantoin 32 Sensitive      Tetracycline >8 Resistant      Tobramycin <=2 Sensitive      Trimethoprim + Sulfamethoxazole <=0.5 Sensitive    Lactic Acid   Result Value Ref Range    Lactic Acid 2.3 (H) 0.5 - 2.2 mmol/L   ECG 12 lead nursing unit performed   Result Value Ref Range    SYSTOLIC BLOOD PRESSURE  mmHg    DIASTOLIC BLOOD PRESSURE  mmHg    VENTRICULAR RATE 86 BPM    ATRIAL RATE 86 BPM    P-R INTERVAL 170 ms    QRS DURATION 60 ms    Q-T INTERVAL 362 ms    QTC CALCULATION (BEZET) 433 ms    P Axis 58 degrees    R AXIS 206 degrees     T AXIS 46 degrees    MUSE DIAGNOSIS       Normal sinus rhythm  Right superior axis deviation  Low voltage QRS  Cannot rule out Anterior infarct , age undetermined  Abnormal ECG  When compared with ECG of 20-SEP-2010 04:36,  Minimal criteria for Anterior infarct are now Present  Confirmed by SANDY PAVON MD LOC:SJ ([id number]) on 10/21/2018 5:43:39 PM     Culture, Blood   Result Value Ref Range    Anaerobic Blood Culture Bottle No Growth No Growth, No organisms seen, bottle returned to instrument, Specimen not received    Aerobic Blood Culture Bottle Positive after 24 hours incubation (!) No Growth, No organisms seen, bottle returned to instrument, Specimen not received   Culture, Blood Positive Work-up   Result Value Ref Range    Culture Staphylococcus capitis (!)     Gram Stain Result Gram positive cocci in clusters        Susceptibility    Staphylococcus capitis - MIHIR     Clindamycin <=0.5 Sensitive      Cefazolin >16 Resistant      Doxycycline <=0.5 Sensitive      Levofloxacin >4 Resistant      Oxacillin >1 Resistant      Vancomycin 1 Sensitive    POCT Glucose   Result Value Ref Range    Glucose,  mg/dL   Troponin I   Result Value Ref Range    Troponin I 0.03 0.00 - 0.29 ng/mL   Glycosylated Hemoglobin A1C   Result Value Ref Range    Hemoglobin A1c 7.0 (H) 4.2 - 6.1 %   Morphology,Smear Review (MORP)   Result Value Ref Range    Pathology, Smear Review See Separate Pathology Report (!) (none)    WBC 12.1 (H) 4.0 - 11.0 thou/uL    RBC 3.17 (L) 3.80 - 5.40 mill/uL    Hemoglobin 9.7 (L) 12.0 - 16.0 g/dL    Hematocrit 29.4 (L) 35.0 - 47.0 %    MCV 93 80 - 100 fL    MCH 30.6 27.0 - 34.0 pg    MCHC 33.0 32.0 - 36.0 g/dL    RDW 13.7 11.0 - 14.5 %    Platelets 202 140 - 440 thou/uL    MPV 10.9 8.5 - 12.5 fL    Neutrophils % 89 (H) 50 - 70 %    Lymphocytes % 9 (L) 20 - 40 %    Monocytes % 2 2 - 10 %    Eosinophils % 0 0 - 6 %    Basophils % 0 0 - 2 %    Neutrophils Absolute 10.7 (H) 2.0 - 7.7 thou/uL    Lymphocytes  Absolute 1.1 0.8 - 4.4 thou/uL    Monocytes Absolute 0.3 0.0 - 0.9 thou/uL    Eosinophils Absolute 0.0 0.0 - 0.4 thou/uL    Basophils Absolute 0.0 0.0 - 0.2 thou/uL   Culture, Blood   Result Value Ref Range    Anaerobic Blood Culture Bottle No Growth No Growth, No organisms seen, bottle returned to instrument, Specimen not received    Aerobic Blood Culture Bottle No Growth No Growth, No organisms seen, bottle returned to instrument, Specimen not received   Peripheral Blood Smear, Path Review   Result Value Ref Range    Case Report       Peripheral Blood Morphology                       Case: [id number]                                   Authorizing Provider:  Trena Ovalle MD    Collected:           10/20/2018 [id number]              Ordering Location:     [facility] P[facility]     Received:            10/20/2018 2301              Pathologist:           Celsa Bauer MD                                                          Specimen:    Peripheral Blood                                                                           Final Diagnosis       PERIPHERAL BLOOD SMEAR MORPHOLOGY:    1) MILD NORMOCHROMIC-NORMOCYTIC ANEMIA          a) MILD NONSPECIFIC POIKILOCYTOSIS WITHOUT INCREASED ANISOCYTOSIS     2) MILD ABSOLUTE NEUTROPHILIA (10.7 K/uL), WITHOUT SIGNIFICANT NEUTROPHILIC LEFT        SHIFT OR TOXIC GRANULATION    3) UNREMARKABLE PLATELET MORPHOLOGY    Comment       The absolute neutrophilia may be secondary to underlying infection or acute phase reaction or prednisone therapy. Clinical correlation is necessary.     The differential diagnosis of a normochromic-normocytic anemia includes acute blood loss, hemolytic anemias, bone marrow erythroid hypoplasia or bone marrow infiltrative processes, endocrinopathies, hypersplenism, chronic renal failure and anemia of chronic disease. A reticulocyte count and erythropoietin level may be helpful. Clinical correlation is necessary.    Clinical Information        History of sarcoidosis, Prednisone, chronic kidney disease, acute aphasia, weakness and confusion, probable UTI.    Peripheral Smear       Erythrocytes are mildly decreased in number, normochromic and normocytic. Increased anisocytosis is not identified. There is mild nonspecific poikilocytosis with occasional ovalocytes. Increased polychromasia, basophilic stippling and nucleated red blood cells are not identified.    Leukocytes are mildly increased in number and show a mild absolute neutrophilia (10.7 K/uL). Occasional bands are present, without increased toxic granulation. Megaloblastic changes, dysplastic features and blasts are not identified. Lymphocytes are mature and polymorphic in appearance.    Platelets are normal in number and morphology.    Charges CPT: 25969  ICD-10: D72.9    POCT Glucose   Result Value Ref Range    Glucose,  mg/dL   POCT Glucose   Result Value Ref Range    Glucose,  mg/dL   Lipid Profile   Result Value Ref Range    Triglycerides 117 <=149 mg/dL    Cholesterol 154 <=199 mg/dL    LDL Calculated 85 <=129 mg/dL    HDL Cholesterol 46 (L) >=50 mg/dL   Lactic Acid   Result Value Ref Range    Lactic Acid 0.9 0.5 - 2.2 mmol/L   Vitamin B12   Result Value Ref Range    Vitamin B-12 364 213 - 816 pg/mL   Thyroid Stimulating Hormone (TSH)   Result Value Ref Range    TSH 0.93 0.30 - 5.00 uIU/mL   Basic Metabolic Panel   Result Value Ref Range    Sodium 140 136 - 145 mmol/L    Potassium 3.6 3.5 - 5.0 mmol/L    Chloride 107 98 - 107 mmol/L    CO2 22 22 - 31 mmol/L    Anion Gap, Calculation 11 5 - 18 mmol/L    Glucose 102 70 - 125 mg/dL    Calcium 8.3 (L) 8.5 - 10.5 mg/dL    BUN 14 8 - 28 mg/dL    Creatinine 1.03 0.60 - 1.10 mg/dL    GFR MDRD Af Amer >60 >60 mL/min/1.73m2    GFR MDRD Non Af Amer 52 (L) >60 mL/min/1.73m2   Magnesium   Result Value Ref Range    Magnesium 1.3 (L) 1.8 - 2.6 mg/dL   Troponin I   Result Value Ref Range    Troponin I 0.03 0.00 - 0.29 ng/mL   HM1 (CBC with Diff)    Result Value Ref Range    WBC 11.2 (H) 4.0 - 11.0 thou/uL    RBC 3.11 (L) 3.80 - 5.40 mill/uL    Hemoglobin 9.4 (L) 12.0 - 16.0 g/dL    Hematocrit 28.7 (L) 35.0 - 47.0 %    MCV 92 80 - 100 fL    MCH 30.2 27.0 - 34.0 pg    MCHC 32.8 32.0 - 36.0 g/dL    RDW 13.7 11.0 - 14.5 %    Platelets 222 140 - 440 thou/uL    MPV 10.9 8.5 - 12.5 fL    Neutrophils % 74 (H) 50 - 70 %    Lymphocytes % 14 (L) 20 - 40 %    Monocytes % 11 (H) 2 - 10 %    Eosinophils % 0 0 - 6 %    Basophils % 0 0 - 2 %    Neutrophils Absolute 8.2 (H) 2.0 - 7.7 thou/uL    Lymphocytes Absolute 1.6 0.8 - 4.4 thou/uL    Monocytes Absolute 1.2 (H) 0.0 - 0.9 thou/uL    Eosinophils Absolute 0.0 0.0 - 0.4 thou/uL    Basophils Absolute 0.0 0.0 - 0.2 thou/uL   Procalcitonin   Result Value Ref Range    Procalcitonin 0.04 0.00 - 0.49 ng/mL   POCT Glucose   Result Value Ref Range    Glucose,  mg/dL   Troponin I   Result Value Ref Range    Troponin I 0.03 0.00 - 0.29 ng/mL   Echo Complete   Result Value Ref Range    BSA 1.53 m2    Hieght 60 in    Weight 1950.4 lbs    /75 mmHg    HR 95 bpm    LV volume diastolic 40.8 46 - 106 cm3    LV volume systolic 12.1 14 - 42 cm3    IVSd 0.64 0.6 - 0.9 cm    LVIDd 4.66 3.8 - 5.2 cm    LVIDs 3.17 2.2 - 3.5 cm    LVOT diam 2 cm    LVOT mean gradient 1 mmHg    LVOT peak VTI 16 cm    LVOT mean kiran 54.2 cm/s    LVOT peak kiran 75.4 cm/s    LVOT peak kiran 75.4 cm/s    LVOT peak gradient 2 mmHg    LV PWd 0.654 0.6 - 0.9 cm    MV E' lat kiran 6.09 cm/s    MV E' lat kiran 6.09 cm/s    MV E' med kiran 5.22 cm/s    MV E' med kiran 5.22 cm/s    LA length 3.4 cm    LA length 5.2 cm    MV decel time 201 ms    MV decel time 201 ms    MV peak A kiran 125 cm/s    MV peak A kiran 125 cm/s    MV peak E kiran 87.3 cm/s    MV peak E kiran 87.3 cm/s    MV mean kiran 91.7 cm/s    MV mean gradient 4 mmHg    MV VTI 28.1 cm    MV mean kiran 91.7 cm/s    MV mean gradient 4 mmHg    MV VTI 28.1 cm    MV peak velocityoctiy 145 cm/s    MV peak velocityoctiy 145 cm/s     TR peak kiran 221 cm/s    LA area 2 12.3 cm2    LA area 2 12.3 cm2    LA area 1 8.2 cm2    LA area 1 8.2 cm2    IVS/PW ratio 1.0     TR peak gradent 19.5 mmHg    LV FS 32.0 28 - 44 %    Echo LVEF calculated 70 55 - 75 %    LV mass 92.0 g    LVOT area 3.14 cm2    LVOT SV 50.2 cm3    LV systolic volume index 7.9 11 - 31 cm3/m2    LV diastolic volume index 26.7 34 - 74 cm3/m2    LV mass index 60.1 g/m2    LV SVi 32.8 ml/m2    LV CO 4.8 l/min    LV Ci 3.1 l/min/m2    Height 60.0 in    Weight 122 lbs   POCT Glucose   Result Value Ref Range    Glucose,  mg/dL   ECG 12 lead with MUSE, prior to first dose of antipsychotics.   Result Value Ref Range    SYSTOLIC BLOOD PRESSURE  mmHg    DIASTOLIC BLOOD PRESSURE  mmHg    VENTRICULAR RATE 100 BPM    ATRIAL RATE 100 BPM    P-R INTERVAL 210 ms    QRS DURATION 66 ms    Q-T INTERVAL 360 ms    QTC CALCULATION (BEZET) 464 ms    P Axis 63 degrees    R AXIS 209 degrees    T AXIS 43 degrees    MUSE DIAGNOSIS       Sinus rhythm with 1st degree A-V block with Fusion complexes  Right superior axis deviation  Low voltage QRS  Cannot rule out Anterior infarct (cited on or before 20-OCT-2018)  Abnormal ECG  When compared with ECG of 20-OCT-2018 21:10,  Fusion complexes are now Present  WI interval has increased  Confirmed by BRANDEN LAYNE, ERIC LOC:JN ([id number]) on 10/22/2018 4:22:52 PM     Basic Metabolic Panel   Result Value Ref Range    Sodium 139 136 - 145 mmol/L    Potassium 3.8 3.5 - 5.0 mmol/L    Chloride 106 98 - 107 mmol/L    CO2 21 (L) 22 - 31 mmol/L    Anion Gap, Calculation 12 5 - 18 mmol/L    Glucose 185 (H) 70 - 125 mg/dL    Calcium 8.3 (L) 8.5 - 10.5 mg/dL    BUN 15 8 - 28 mg/dL    Creatinine 1.25 (H) 0.60 - 1.10 mg/dL    GFR MDRD Af Amer 50 (L) >60 mL/min/1.73m2    GFR MDRD Non Af Amer 41 (L) >60 mL/min/1.73m2   HM2(CBC W/O DIFF)   Result Value Ref Range    WBC 10.9 4.0 - 11.0 thou/uL    RBC 2.84 (L) 3.80 - 5.40 mill/uL    Hemoglobin 8.6 (L) 12.0 - 16.0 g/dL    Hematocrit  26.6 (L) 35.0 - 47.0 %    MCV 94 80 - 100 fL    MCH 30.3 27.0 - 34.0 pg    MCHC 32.3 32.0 - 36.0 g/dL    RDW 13.7 11.0 - 14.5 %    Platelets 211 140 - 440 thou/uL    MPV 10.7 8.5 - 12.5 fL   POCT Glucose   Result Value Ref Range    Glucose,  mg/dL   Potassium - Next AM   Result Value Ref Range    Potassium 3.4 (L) 3.5 - 5.0 mmol/L   Magnesium   Result Value Ref Range    Magnesium 1.8 1.8 - 2.6 mg/dL   Homocysteine   Result Value Ref Range    Homocysteine 23 (H) 0 - 13 umol/L   Methylmalonic Acid (MMA), Quantitative   Result Value Ref Range    MMA Serum/Plasma, Vitamin B12 Status 0.89 (H) 0.00 - 0.40 umol/L   POCT Glucose   Result Value Ref Range    Glucose, POC 79 mg/dL   POCT Glucose   Result Value Ref Range    Glucose, POC 84 mg/dL   Potassium   Result Value Ref Range    Potassium 3.7 3.5 - 5.0 mmol/L   POCT Glucose   Result Value Ref Range    Glucose,  mg/dL   POCT Glucose   Result Value Ref Range    Glucose,  mg/dL   HM2(CBC w/o Differential)   Result Value Ref Range    WBC 10.5 4.0 - 11.0 thou/uL    RBC 2.90 (L) 3.80 - 5.40 mill/uL    Hemoglobin 8.8 (L) 12.0 - 16.0 g/dL    Hematocrit 28.2 (L) 35.0 - 47.0 %    MCV 97 80 - 100 fL    MCH 30.3 27.0 - 34.0 pg    MCHC 31.2 (L) 32.0 - 36.0 g/dL    RDW 14.1 11.0 - 14.5 %    Platelets 178 140 - 440 thou/uL    MPV 11.6 8.5 - 12.5 fL   Magnesium   Result Value Ref Range    Magnesium 1.6 (L) 1.8 - 2.6 mg/dL   Basic Metabolic Panel   Result Value Ref Range    Sodium 142 136 - 145 mmol/L    Potassium 3.7 3.5 - 5.0 mmol/L    Chloride 109 (H) 98 - 107 mmol/L    CO2 26 22 - 31 mmol/L    Anion Gap, Calculation 7 5 - 18 mmol/L    Glucose 97 70 - 125 mg/dL    Calcium 8.4 (L) 8.5 - 10.5 mg/dL    BUN 15 8 - 28 mg/dL    Creatinine 1.03 0.60 - 1.10 mg/dL    GFR MDRD Af Amer >60 >60 mL/min/1.73m2    GFR MDRD Non Af Amer 52 (L) >60 mL/min/1.73m2   POCT Glucose   Result Value Ref Range    Glucose, POC 96 mg/dL   POCT Glucose   Result Value Ref Range    Glucose, POC  218 mg/dL   POCT Glucose   Result Value Ref Range    Glucose,  mg/dL   POCT Glucose   Result Value Ref Range    Glucose,  mg/dL   Potassium - Next AM   Result Value Ref Range    Potassium 3.8 3.5 - 5.0 mmol/L   POCT Glucose   Result Value Ref Range    Glucose, POC 99 mg/dL   POCT Glucose   Result Value Ref Range    Glucose,  mg/dL   Hemoglobin   Result Value Ref Range    Hemoglobin 9.2 (L) 12.0 - 16.0 g/dL   Comprehensive Metabolic Panel   Result Value Ref Range    Sodium 139 136 - 145 mmol/L    Potassium 3.8 3.5 - 5.0 mmol/L    Chloride 104 98 - 107 mmol/L    CO2 27 22 - 31 mmol/L    Anion Gap, Calculation 8 5 - 18 mmol/L    Glucose 114 70 - 125 mg/dL    BUN 16 8 - 28 mg/dL    Creatinine 1.03 0.60 - 1.10 mg/dL    GFR MDRD Af Amer >60 >60 mL/min/1.73m2    GFR MDRD Non Af Amer 52 (L) >60 mL/min/1.73m2    Bilirubin, Total 0.4 0.0 - 1.0 mg/dL    Calcium 8.9 8.5 - 10.5 mg/dL    Protein, Total 6.7 6.0 - 8.0 g/dL    Albumin 2.8 (L) 3.5 - 5.0 g/dL    Alkaline Phosphatase 88 45 - 120 U/L    AST 11 0 - 40 U/L    ALT <9 0 - 45 U/L       ASSESSMENT:      ICD-10-CM    1. UTI (urinary tract infection), bacterial N39.0     A49.9    2. Hypomagnesemia E83.42    3. COLLIN (acute kidney injury) (H) N17.9    4. Expressive aphasia R47.01    5. Diabetes mellitus due to underlying condition with hyperosmolarity without coma, with long-term current use of insulin (H) E08.00     Z79.4    6. Essential hypertension I10    7. Sarcoidosis of lung (H) D86.0    8. Malignant neoplasm of colon, unspecified part of colon (H) C18.9      PLAN:    1. UTI: stable, continue keflex, push fluids  2. Hypomagnesemia; check BMP, await labs  3. COLLIN: check BMP   4. Expressive aphasia; participate in therapies  5. DM: stable continue medications; monitor now on new sitagliptin  6. HTN: stable continue current meds  7. Sarcoidosis of lung: stable monitor breathing  8. Colon CA: stable     I, Greer Garcias CNP, am scribing for and in the presence  of Augustine Weber.    I, Augustine Weber, personally performed the services described in this documentation, as scribed by Augustine Weber, and it is both accurate and complete.     Electronically signed by: Augustine Weber, DO

## 2021-06-21 NOTE — PROGRESS NOTES
Code Status:  FULL CODE  Visit Type: Discharge Summary     Facility:  [facility] [[id number]]         Facility Type: SNF (Skilled Nursing Facility, TCU)    History of Present Illness: Paola Valdez is a 78 y.o. female seen today for follow-up on the TCU.  Patient with with hx of sarcoidosis, on chronic prednisone, hx of colon cancer DM, HTN, lipids, CKD stage 3, presents with acute aphashia, however over past 2 weeks weakness and intermit epoisodes of confusion as well.  Patient hospitalized on 10/20/2018 with acute confusional state with intermittent dysarthria.  MRI of the brain was negative for acute stroke however it did show chronic infarct in the cerebellar area.  EEG G results reviewed.  Neurology recommended no seizure medications.  Confusion did improve with IV antibiotics and IV fluids.  She was discharged on p.o. Keflex.  She did have a positive blood culture however it was thought that this could be contaminant for skin.  She was monitored for aspiration.  Serial troponins negative.  Magnesium was replaced.  She continues on aspirin and statin for stroke prophylaxis.  She does have underlying dementia.  Recent cataract surgery on the right eye on 9/24 on the left eye 10/8.      Patient sitting up in bedside chair. Expressive aphasia with cognitive deficit. No behaviors. Blood sugar satisfactory controlled with oral anti-hyperglycemics.  She is steroid-dependent secondary to sarcoidosis.  No dysphagia.  Eating regular diet. She currently lives with her son.               Active Ambulatory Problems     Diagnosis Date Noted     Hypertension      Diabetes mellitus (H)      Iron deficiency anemia due to chronic blood loss 01/06/2016     Sarcoidosis of lung (H)      Malnutrition of moderate degree (H) 01/13/2016     Colon cancer (H) 01/29/2016     Expressive aphasia 10/20/2018     Lactic acidosis 10/20/2018     Leukocytosis 10/20/2018     Acute confusion      Hypomagnesemia      UTI (urinary  tract infection), bacterial      COLLIN (acute kidney injury) (H)      Acute cystitis without hematuria      Resolved Ambulatory Problems     Diagnosis Date Noted     Hypokalemia 01/06/2016     Hyperglycemia 01/21/2016     Past Medical History:   Diagnosis Date     Cataracts, bilateral      Diabetes mellitus (H)      Gallstone      [place] (hard of hearing), bilateral      Hyperlipemia      Hypertension      Iron deficiency anemia secondary to blood loss (chronic)      Pneumonia      Sarcoidosis of lung (H)        Current Outpatient Medications   Medication Sig     acetaminophen (TYLENOL) 500 MG tablet Take 1 tablet (500 mg total) by mouth every 4 (four) hours as needed for pain or fever.     aspirin 81 MG EC tablet Take 1 tablet (81 mg total) by mouth daily.     escitalopram oxalate (LEXAPRO) 10 MG tablet Take 10 mg by mouth daily.     losartan (COZAAR) 50 MG tablet Take 50 mg by mouth daily. Indications: Hypertension     metFORMIN (GLUCOPHAGE) 500 MG tablet Take 1 tablet (500 mg total) by mouth 2 (two) times a day with meals.     metoprolol succinate (TOPROL-XL) 50 MG 24 hr tablet Take 100 mg by mouth daily.      predniSONE (DELTASONE) 20 MG tablet Take 20 mg by mouth every other day. For lungs     simvastatin (ZOCOR) 20 MG tablet Take 1 tablet (20 mg total) by mouth at bedtime.     sitaGLIPtin (JANUVIA) 50 MG tablet Take 1 tablet (50 mg total) by mouth every other day. Given along with prednisone     traZODone (DESYREL) 50 MG tablet Take 0.5 tablets (25 mg total) by mouth at bedtime as needed for sleep (Sundowning/agitation).       No Known Allergies      Review of Systems   Patient poor historian secondary to advanced dementia and expressive aphasia.  Most information obtained from nursing staff.    Physical Exam   PHYSICAL EXAMINATION:  Vital signs: /66, heart rate 98 respirations 20, temperature 98.6.O2 sat 97% on room air.  Weight 122.6 pounds.  General: Awake, Alert, oriented x1, appropriately, follows simple  commands, quiet on exam.   HEENT:PERRLA, Pink conjunctiva, anicteric sclerae, moist oral mucosa.  Poor dentition.  Facial symmetry.  Tongue is midline.  NECK: Supple, without any lymphadenopathy, or masses  CVS:  S1  S2, without murmur or gallop.   LUNG: Clear to auscultation, No wheezes, rales or rhonci.  BACK: No kyphosis of the thoracic spine  ABDOMEN: Soft, nontender to palpation, with positive bowel sounds  EXTREMITIES: Good range of motion on both upper and lower extremities, no pedal edema, no calf tenderness.   are equal.  She is able to overcome gravity in all 4 extremities.  SKIN: Warm and dry, no rashes or erythema noted  NEUROLOGIC: Intact, pulses palpable  PSYCHIATRIC: Cognitive impairment with expressive aphasia.            Labs:    Labs reviewed in the record.    Assessment/Plan:  1. UTI (urinary tract infection), bacterial     2. Expressive aphasia     3. Cerebellar infarct (H)     4. Diabetes mellitus due to underlying condition with hyperosmolarity without coma, with long-term current use of insulin (H)     5. Essential hypertension     6. Sarcoidosis of lung (H)       Patient with recent hospitalization with UTI and old cerebellar infarct.  Patient with expressive aphasia.  She is pleasantly confused.  She continues on speech therapy.  She is eating regular diet.  Note is positive.  She is voiding without difficulty.  Diabetes satisfactory controlled with oral anti-hyperglycemics.  Acute kidney injury improved.  Sarcoidosis of the lung.  Lung sounds clear.  She is steroid-dependent.    Pt will d/c home with current meds and treatment. Home PT, OT, HHA and RN. Follow up with PCP in 1 week.         Electronically signed by: Tatianna Mackenzie CNP

## 2021-06-21 NOTE — PROGRESS NOTES
Code Status:  FULL CODE  Visit Type: Problem Visit     Facility:  [facility] [[id number]]         Facility Type: SNF (Skilled Nursing Facility, TCU)    History of Present Illness: Paola Valdez is a 78 y.o. female seen today for follow-up on the TCU.  Patient with with hx of sarcoidosis, on chronic prednisone, hx of colon cancer DM, HTN, lipids, CKD stage 3, presents with acute aphashia, however over past 2 weeks weakness and intermit epoisodes of confusion as well.  Patient hospitalized on 10/20/2018 with acute confusional state with intermittent dysarthria.  MRI of the brain was negative for acute stroke however it did show chronic infarct in the cerebellar area.  EEG G results reviewed.  Neurology recommended no seizure medications.  Confusion did improve with IV antibiotics and IV fluids.  She was discharged on p.o. Keflex.  She did have a positive blood culture however it was thought that this could be contaminant for skin.  She was monitored for aspiration.  Serial troponins negative.  Magnesium was replaced.  She continues on aspirin and statin for stroke prophylaxis.  She does have underlying dementia.  Recent cataract surgery on the right eye on 9/24 on the left eye 10/8.      Today patient sitting up in bedside chair.  Denies any burning or pain with urination.  She tells me that she is.  She is completed her oral anti-.  She is afebrile.  She did have some orthostatic BPs originally when admitted.  She is moving quite well.  Hemoglobin 9.2.  She denies any dysphagia.  She is on an H2 diet.          Active Ambulatory Problems     Diagnosis Date Noted     Hypertension      Diabetes mellitus (H)      Iron deficiency anemia due to chronic blood loss 01/06/2016     Sarcoidosis of lung (H)      Malnutrition of moderate degree (H) 01/13/2016     Colon cancer (H) 01/29/2016     Expressive aphasia 10/20/2018     Lactic acidosis 10/20/2018     Leukocytosis 10/20/2018     Acute confusion       Hypomagnesemia      UTI (urinary tract infection), bacterial      COLLIN (acute kidney injury) (H)      Acute cystitis without hematuria      Resolved Ambulatory Problems     Diagnosis Date Noted     Hypokalemia 01/06/2016     Hyperglycemia 01/21/2016     Past Medical History:   Diagnosis Date     Cataracts, bilateral      Diabetes mellitus (H)      Gallstone      [place] (hard of hearing), bilateral      Hyperlipemia      Hypertension      Iron deficiency anemia secondary to blood loss (chronic)      Pneumonia      Sarcoidosis of lung (H)        Current Outpatient Prescriptions   Medication Sig     acetaminophen (TYLENOL) 500 MG tablet Take 1 tablet (500 mg total) by mouth every 4 (four) hours as needed for pain or fever.     aspirin 81 MG EC tablet Take 1 tablet (81 mg total) by mouth daily.     escitalopram oxalate (LEXAPRO) 10 MG tablet Take 10 mg by mouth daily.     losartan (COZAAR) 50 MG tablet Take 50 mg by mouth daily. Indications: Hypertension     magnesium oxide (MAG-OX) 400 mg (241.3 mg magnesium) tablet Take 1 tablet (400 mg total) by mouth 2 (two) times a day for 10 days.     metFORMIN (GLUCOPHAGE) 500 MG tablet Take 1 tablet (500 mg total) by mouth 2 (two) times a day with meals.     metoprolol succinate (TOPROL-XL) 50 MG 24 hr tablet Take 100 mg by mouth daily.      prednisoLONE acetate (PRED-FORTE) 1 % ophthalmic suspension Administer 1 drop into the left eye 4 (four) times a day.      predniSONE (DELTASONE) 20 MG tablet Take 20 mg by mouth every other day. For lungs     simvastatin (ZOCOR) 20 MG tablet Take 1 tablet (20 mg total) by mouth at bedtime.     sitaGLIPtin (JANUVIA) 50 MG tablet Take 1 tablet (50 mg total) by mouth every other day. Given along with prednisone     traZODone (DESYREL) 50 MG tablet Take 0.5 tablets (25 mg total) by mouth at bedtime as needed for sleep (Sundowning/agitation).       No Known Allergies      Review of Systems   Patient poor historian secondary to advanced dementia.   Most information obtained from nursing staff.    Physical Exam   PHYSICAL EXAMINATION:  Vital signs: /67, heart rate 80, respirations 20 temperature 97.1, O2 sat 97% on room air.  Weight 122.6 pounds.  General: Awake, Alert, oriented x1, appropriately, follows simple commands, conversant  HEENT:PERRLA, Pink conjunctiva, anicteric sclerae, moist oral mucosa.  Poor dentition.  Facial symmetry.  Tongue is midline.  NECK: Supple, without any lymphadenopathy, or masses  CVS:  S1  S2, without murmur or gallop.   LUNG: Clear to auscultation, No wheezes, rales or rhonci.  BACK: No kyphosis of the thoracic spine  ABDOMEN: Soft, nontender to palpation, with positive bowel sounds  EXTREMITIES: Good range of motion on both upper and lower extremities, no pedal edema, no calf tenderness.   are equal.  She is able to overcome gravity in all 4 extremities.  SKIN: Warm and dry, no rashes or erythema noted  NEUROLOGIC: Intact, pulses palpable  PSYCHIATRIC: Cognitive impairment with expressive aphasia.            Labs:    Recent Results (from the past 240 hour(s))   POCT Glucose   Result Value Ref Range    Glucose,  mg/dL   Lipid Profile   Result Value Ref Range    Triglycerides 117 <=149 mg/dL    Cholesterol 154 <=199 mg/dL    LDL Calculated 85 <=129 mg/dL    HDL Cholesterol 46 (L) >=50 mg/dL   Lactic Acid   Result Value Ref Range    Lactic Acid 0.9 0.5 - 2.2 mmol/L   Vitamin B12   Result Value Ref Range    Vitamin B-12 364 213 - 816 pg/mL   Thyroid Stimulating Hormone (TSH)   Result Value Ref Range    TSH 0.93 0.30 - 5.00 uIU/mL   Basic Metabolic Panel   Result Value Ref Range    Sodium 140 136 - 145 mmol/L    Potassium 3.6 3.5 - 5.0 mmol/L    Chloride 107 98 - 107 mmol/L    CO2 22 22 - 31 mmol/L    Anion Gap, Calculation 11 5 - 18 mmol/L    Glucose 102 70 - 125 mg/dL    Calcium 8.3 (L) 8.5 - 10.5 mg/dL    BUN 14 8 - 28 mg/dL    Creatinine 1.03 0.60 - 1.10 mg/dL    GFR MDRD Af Amer >60 >60 mL/min/1.73m2    GFR  MDRD Non Af Amer 52 (L) >60 mL/min/1.73m2   Magnesium   Result Value Ref Range    Magnesium 1.3 (L) 1.8 - 2.6 mg/dL   Troponin I   Result Value Ref Range    Troponin I 0.03 0.00 - 0.29 ng/mL   HM1 (CBC with Diff)   Result Value Ref Range    WBC 11.2 (H) 4.0 - 11.0 thou/uL    RBC 3.11 (L) 3.80 - 5.40 mill/uL    Hemoglobin 9.4 (L) 12.0 - 16.0 g/dL    Hematocrit 28.7 (L) 35.0 - 47.0 %    MCV 92 80 - 100 fL    MCH 30.2 27.0 - 34.0 pg    MCHC 32.8 32.0 - 36.0 g/dL    RDW 13.7 11.0 - 14.5 %    Platelets 222 140 - 440 thou/uL    MPV 10.9 8.5 - 12.5 fL    Neutrophils % 74 (H) 50 - 70 %    Lymphocytes % 14 (L) 20 - 40 %    Monocytes % 11 (H) 2 - 10 %    Eosinophils % 0 0 - 6 %    Basophils % 0 0 - 2 %    Neutrophils Absolute 8.2 (H) 2.0 - 7.7 thou/uL    Lymphocytes Absolute 1.6 0.8 - 4.4 thou/uL    Monocytes Absolute 1.2 (H) 0.0 - 0.9 thou/uL    Eosinophils Absolute 0.0 0.0 - 0.4 thou/uL    Basophils Absolute 0.0 0.0 - 0.2 thou/uL   Procalcitonin   Result Value Ref Range    Procalcitonin 0.04 0.00 - 0.49 ng/mL   POCT Glucose   Result Value Ref Range    Glucose,  mg/dL   Troponin I   Result Value Ref Range    Troponin I 0.03 0.00 - 0.29 ng/mL   Echo Complete   Result Value Ref Range    BSA 1.53 m2    Hieght 60 in    Weight 1950.4 lbs    /75 mmHg    HR 95 bpm    LV volume diastolic 40.8 46 - 106 cm3    LV volume systolic 12.1 14 - 42 cm3    IVSd 0.64 0.6 - 0.9 cm    LVIDd 4.66 3.8 - 5.2 cm    LVIDs 3.17 2.2 - 3.5 cm    LVOT diam 2 cm    LVOT mean gradient 1 mmHg    LVOT peak VTI 16 cm    LVOT mean kiran 54.2 cm/s    LVOT peak kiran 75.4 cm/s    LVOT peak kiran 75.4 cm/s    LVOT peak gradient 2 mmHg    LV PWd 0.654 0.6 - 0.9 cm    MV E' lat kiran 6.09 cm/s    MV E' lat kiran 6.09 cm/s    MV E' med kiran 5.22 cm/s    MV E' med kiran 5.22 cm/s    LA length 3.4 cm    LA length 5.2 cm    MV decel time 201 ms    MV decel time 201 ms    MV peak A kiran 125 cm/s    MV peak A kiran 125 cm/s    MV peak E kiran 87.3 cm/s    MV peak E kiran 87.3  cm/s    MV mean kiran 91.7 cm/s    MV mean gradient 4 mmHg    MV VTI 28.1 cm    MV mean kiran 91.7 cm/s    MV mean gradient 4 mmHg    MV VTI 28.1 cm    MV peak velocityoctiy 145 cm/s    MV peak velocityoctiy 145 cm/s    TR peak kiran 221 cm/s    LA area 2 12.3 cm2    LA area 2 12.3 cm2    LA area 1 8.2 cm2    LA area 1 8.2 cm2    IVS/PW ratio 1.0     TR peak gradent 19.5 mmHg    LV FS 32.0 28 - 44 %    Echo LVEF calculated 70 55 - 75 %    LV mass 92.0 g    LVOT area 3.14 cm2    LVOT SV 50.2 cm3    LV systolic volume index 7.9 11 - 31 cm3/m2    LV diastolic volume index 26.7 34 - 74 cm3/m2    LV mass index 60.1 g/m2    LV SVi 32.8 ml/m2    LV CO 4.8 l/min    LV Ci 3.1 l/min/m2    Height 60.0 in    Weight 122 lbs   POCT Glucose   Result Value Ref Range    Glucose,  mg/dL   ECG 12 lead with MUSE, prior to first dose of antipsychotics.   Result Value Ref Range    SYSTOLIC BLOOD PRESSURE  mmHg    DIASTOLIC BLOOD PRESSURE  mmHg    VENTRICULAR RATE 100 BPM    ATRIAL RATE 100 BPM    P-R INTERVAL 210 ms    QRS DURATION 66 ms    Q-T INTERVAL 360 ms    QTC CALCULATION (BEZET) 464 ms    P Axis 63 degrees    R AXIS 209 degrees    T AXIS 43 degrees    MUSE DIAGNOSIS       Sinus rhythm with 1st degree A-V block with Fusion complexes  Right superior axis deviation  Low voltage QRS  Cannot rule out Anterior infarct (cited on or before 20-OCT-2018)  Abnormal ECG  When compared with ECG of 20-OCT-2018 21:10,  Fusion complexes are now Present  MN interval has increased  Confirmed by BRANDEN LAYNE, ERIC LOC:JN ([id number]) on 10/22/2018 4:22:52 PM     Basic Metabolic Panel   Result Value Ref Range    Sodium 139 136 - 145 mmol/L    Potassium 3.8 3.5 - 5.0 mmol/L    Chloride 106 98 - 107 mmol/L    CO2 21 (L) 22 - 31 mmol/L    Anion Gap, Calculation 12 5 - 18 mmol/L    Glucose 185 (H) 70 - 125 mg/dL    Calcium 8.3 (L) 8.5 - 10.5 mg/dL    BUN 15 8 - 28 mg/dL    Creatinine 1.25 (H) 0.60 - 1.10 mg/dL    GFR MDRD Af Amer 50 (L) >60 mL/min/1.73m2     GFR MDRD Non Af Amer 41 (L) >60 mL/min/1.73m2   HM2(CBC W/O DIFF)   Result Value Ref Range    WBC 10.9 4.0 - 11.0 thou/uL    RBC 2.84 (L) 3.80 - 5.40 mill/uL    Hemoglobin 8.6 (L) 12.0 - 16.0 g/dL    Hematocrit 26.6 (L) 35.0 - 47.0 %    MCV 94 80 - 100 fL    MCH 30.3 27.0 - 34.0 pg    MCHC 32.3 32.0 - 36.0 g/dL    RDW 13.7 11.0 - 14.5 %    Platelets 211 140 - 440 thou/uL    MPV 10.7 8.5 - 12.5 fL   POCT Glucose   Result Value Ref Range    Glucose,  mg/dL   Potassium - Next AM   Result Value Ref Range    Potassium 3.4 (L) 3.5 - 5.0 mmol/L   Magnesium   Result Value Ref Range    Magnesium 1.8 1.8 - 2.6 mg/dL   Homocysteine   Result Value Ref Range    Homocysteine 23 (H) 0 - 13 umol/L   Methylmalonic Acid (MMA), Quantitative   Result Value Ref Range    MMA Serum/Plasma, Vitamin B12 Status 0.89 (H) 0.00 - 0.40 umol/L   POCT Glucose   Result Value Ref Range    Glucose, POC 79 mg/dL   POCT Glucose   Result Value Ref Range    Glucose, POC 84 mg/dL   Potassium   Result Value Ref Range    Potassium 3.7 3.5 - 5.0 mmol/L   POCT Glucose   Result Value Ref Range    Glucose,  mg/dL   POCT Glucose   Result Value Ref Range    Glucose,  mg/dL   HM2(CBC w/o Differential)   Result Value Ref Range    WBC 10.5 4.0 - 11.0 thou/uL    RBC 2.90 (L) 3.80 - 5.40 mill/uL    Hemoglobin 8.8 (L) 12.0 - 16.0 g/dL    Hematocrit 28.2 (L) 35.0 - 47.0 %    MCV 97 80 - 100 fL    MCH 30.3 27.0 - 34.0 pg    MCHC 31.2 (L) 32.0 - 36.0 g/dL    RDW 14.1 11.0 - 14.5 %    Platelets 178 140 - 440 thou/uL    MPV 11.6 8.5 - 12.5 fL   Magnesium   Result Value Ref Range    Magnesium 1.6 (L) 1.8 - 2.6 mg/dL   Basic Metabolic Panel   Result Value Ref Range    Sodium 142 136 - 145 mmol/L    Potassium 3.7 3.5 - 5.0 mmol/L    Chloride 109 (H) 98 - 107 mmol/L    CO2 26 22 - 31 mmol/L    Anion Gap, Calculation 7 5 - 18 mmol/L    Glucose 97 70 - 125 mg/dL    Calcium 8.4 (L) 8.5 - 10.5 mg/dL    BUN 15 8 - 28 mg/dL    Creatinine 1.03 0.60 - 1.10 mg/dL     GFR MDRD Af Amer >60 >60 mL/min/1.73m2    GFR MDRD Non Af Amer 52 (L) >60 mL/min/1.73m2   POCT Glucose   Result Value Ref Range    Glucose, POC 96 mg/dL   POCT Glucose   Result Value Ref Range    Glucose,  mg/dL   POCT Glucose   Result Value Ref Range    Glucose,  mg/dL   POCT Glucose   Result Value Ref Range    Glucose,  mg/dL   Potassium - Next AM   Result Value Ref Range    Potassium 3.8 3.5 - 5.0 mmol/L   POCT Glucose   Result Value Ref Range    Glucose, POC 99 mg/dL   POCT Glucose   Result Value Ref Range    Glucose,  mg/dL   Hemoglobin   Result Value Ref Range    Hemoglobin 9.2 (L) 12.0 - 16.0 g/dL   Comprehensive Metabolic Panel   Result Value Ref Range    Sodium 139 136 - 145 mmol/L    Potassium 3.8 3.5 - 5.0 mmol/L    Chloride 104 98 - 107 mmol/L    CO2 27 22 - 31 mmol/L    Anion Gap, Calculation 8 5 - 18 mmol/L    Glucose 114 70 - 125 mg/dL    BUN 16 8 - 28 mg/dL    Creatinine 1.03 0.60 - 1.10 mg/dL    GFR MDRD Af Amer >60 >60 mL/min/1.73m2    GFR MDRD Non Af Amer 52 (L) >60 mL/min/1.73m2    Bilirubin, Total 0.4 0.0 - 1.0 mg/dL    Calcium 8.9 8.5 - 10.5 mg/dL    Protein, Total 6.7 6.0 - 8.0 g/dL    Albumin 2.8 (L) 3.5 - 5.0 g/dL    Alkaline Phosphatase 88 45 - 120 U/L    AST 11 0 - 40 U/L    ALT <9 0 - 45 U/L     Recent follow-up BMP showed a GFR 52.  Creatinine within normal limits.    Assessment/Plan:  1. UTI (urinary tract infection), bacterial     2. COLLIN (acute kidney injury) (H)     3. Hypomagnesemia     4. Expressive aphasia     5. Sarcoidosis of lung (H)     6. Malignant neoplasm of colon, unspecified part of colon (H)     7. Diabetes mellitus due to underlying condition with hyperosmolarity without coma, with long-term current use of insulin (H)     8. Essential hypertension       Patient with recent hospitalization with UTI.  She is completed her antibiotic therapy no dysuria.  Acute kidney injury that improved with fluids.  Recent BMP showed a creatinine with normal  limits and GFR.  Magnesium supplemented.  Hypertension.  Satisfactory controlled.  She did have some orthostasis admission however this is improved.  Diabetes.  Satisfactory controlled.  Chronic colon CA.  Stable.  Sarcoidosis of the lung.  Stable.  Lung sounds are clear.  No cough.  We will continue to rehab her and increase her strength.      Electronically signed by: Tatianna Mackenzie, CNP

## 2021-06-21 NOTE — PROGRESS NOTES
Code Status:  FULL CODE  Visit Type: Problem Visit     Facility:  [facility] [[id number]]         Facility Type: SNF (Skilled Nursing Facility, TCU)    History of Present Illness: Paola Valdez is a 78 y.o. female seen today for follow-up on the TCU.  Patient with with hx of sarcoidosis, on chronic prednisone, hx of colon cancer DM, HTN, lipids, CKD stage 3, presents with acute aphashia, however over past 2 weeks weakness and intermit epoisodes of confusion as well.  Patient hospitalized on 10/20/2018 with acute confusional state with intermittent dysarthria.  MRI of the brain was negative for acute stroke however it did show chronic infarct in the cerebellar area.  EEG G results reviewed.  Neurology recommended no seizure medications.  Confusion did improve with IV antibiotics and IV fluids.  She was discharged on p.o. Keflex.  She did have a positive blood culture however it was thought that this could be contaminant for skin.  She was monitored for aspiration.  Serial troponins negative.  Magnesium was replaced.  She continues on aspirin and statin for stroke prophylaxis.  She does have underlying dementia.  Recent cataract surgery on the right eye on 9/24 on the left eye 10/8.      Today patient sitting up in bedside chair.  She is voiding adequately.  Blood sugars are controlled.  She is moving quite well.  Denies any pain.        Active Ambulatory Problems     Diagnosis Date Noted     Hypertension      Diabetes mellitus (H)      Iron deficiency anemia due to chronic blood loss 01/06/2016     Sarcoidosis of lung (H)      Malnutrition of moderate degree (H) 01/13/2016     Colon cancer (H) 01/29/2016     Expressive aphasia 10/20/2018     Lactic acidosis 10/20/2018     Leukocytosis 10/20/2018     Acute confusion      Hypomagnesemia      UTI (urinary tract infection), bacterial      COLLIN (acute kidney injury) (H)      Acute cystitis without hematuria      Resolved Ambulatory Problems     Diagnosis  Date Noted     Hypokalemia 01/06/2016     Hyperglycemia 01/21/2016     Past Medical History:   Diagnosis Date     Cataracts, bilateral      Diabetes mellitus (H)      Gallstone      [place] (hard of hearing), bilateral      Hyperlipemia      Hypertension      Iron deficiency anemia secondary to blood loss (chronic)      Pneumonia      Sarcoidosis of lung (H)        Current Outpatient Prescriptions   Medication Sig     acetaminophen (TYLENOL) 500 MG tablet Take 1 tablet (500 mg total) by mouth every 4 (four) hours as needed for pain or fever.     aspirin 81 MG EC tablet Take 1 tablet (81 mg total) by mouth daily.     escitalopram oxalate (LEXAPRO) 10 MG tablet Take 10 mg by mouth daily.     losartan (COZAAR) 50 MG tablet Take 50 mg by mouth daily. Indications: Hypertension     metFORMIN (GLUCOPHAGE) 500 MG tablet Take 1 tablet (500 mg total) by mouth 2 (two) times a day with meals.     metoprolol succinate (TOPROL-XL) 50 MG 24 hr tablet Take 100 mg by mouth daily.      prednisoLONE acetate (PRED-FORTE) 1 % ophthalmic suspension Administer 1 drop into the left eye 4 (four) times a day.      predniSONE (DELTASONE) 20 MG tablet Take 20 mg by mouth every other day. For lungs     simvastatin (ZOCOR) 20 MG tablet Take 1 tablet (20 mg total) by mouth at bedtime.     sitaGLIPtin (JANUVIA) 50 MG tablet Take 1 tablet (50 mg total) by mouth every other day. Given along with prednisone     traZODone (DESYREL) 50 MG tablet Take 0.5 tablets (25 mg total) by mouth at bedtime as needed for sleep (Sundowning/agitation).       No Known Allergies      Review of Systems   Patient poor historian secondary to advanced dementia.  Most information obtained from nursing staff.    Physical Exam   PHYSICAL EXAMINATION:  Vital signs: /62, heart rate 94, respirations 16, temperature 99.2, O2 sat 96% on room air.  Weight 122.6 pounds.  General: Awake, Alert, oriented x1, appropriately, follows simple commands, conversant  HEENT:PERRLA, Pink  conjunctiva, anicteric sclerae, moist oral mucosa.  Poor dentition.  Facial symmetry.  Tongue is midline.  NECK: Supple, without any lymphadenopathy, or masses  CVS:  S1  S2, without murmur or gallop.   LUNG: Clear to auscultation, No wheezes, rales or rhonci.  BACK: No kyphosis of the thoracic spine  ABDOMEN: Soft, nontender to palpation, with positive bowel sounds  EXTREMITIES: Good range of motion on both upper and lower extremities, no pedal edema, no calf tenderness.   are equal.  She is able to overcome gravity in all 4 extremities.  SKIN: Warm and dry, no rashes or erythema noted  NEUROLOGIC: Intact, pulses palpable  PSYCHIATRIC: Cognitive impairment with expressive aphasia.            Labs:    Recent Results (from the past 240 hour(s))   Hemoglobin   Result Value Ref Range    Hemoglobin 9.2 (L) 12.0 - 16.0 g/dL   Comprehensive Metabolic Panel   Result Value Ref Range    Sodium 139 136 - 145 mmol/L    Potassium 3.8 3.5 - 5.0 mmol/L    Chloride 104 98 - 107 mmol/L    CO2 27 22 - 31 mmol/L    Anion Gap, Calculation 8 5 - 18 mmol/L    Glucose 114 70 - 125 mg/dL    BUN 16 8 - 28 mg/dL    Creatinine 1.03 0.60 - 1.10 mg/dL    GFR MDRD Af Amer >60 >60 mL/min/1.73m2    GFR MDRD Non Af Amer 52 (L) >60 mL/min/1.73m2    Bilirubin, Total 0.4 0.0 - 1.0 mg/dL    Calcium 8.9 8.5 - 10.5 mg/dL    Protein, Total 6.7 6.0 - 8.0 g/dL    Albumin 2.8 (L) 3.5 - 5.0 g/dL    Alkaline Phosphatase 88 45 - 120 U/L    AST 11 0 - 40 U/L    ALT <9 0 - 45 U/L     Recent follow-up BMP showed a GFR 52.  Creatinine within normal limits.    Assessment/Plan:  1. UTI (urinary tract infection), bacterial     2. COLLIN (acute kidney injury) (H)     3. Expressive aphasia     4. Diabetes mellitus due to underlying condition with hyperosmolarity without coma, with long-term current use of insulin (H)     5. Essential hypertension       Patient with recent hospitalization with UTI.  She is completed her antibiotic therapy no dysuria.  She is voiding  adequately.  Acute kidney injury that improved with fluids.  Recent laboratory unremarkable.  Hypertension.  Satisfactory controlled.  She did have some orthostasis admission however this is improved.  Diabetes.  Satisfactory controlled.      Electronically signed by: Tatianna Mackenzie CNP

## 2021-06-21 NOTE — PROGRESS NOTES
Code Status:  FULL CODE  Visit Type: Follow Up     Facility:  [facility] [[id number]]        Facility Type: SNF (Skilled Nursing Facility, TCU)    History of Present Illness: Paola Valdez is a 78 y.o. female seen today for TCU follow up. She has a hx of sarcoidosis on chronic prednisone, hx of colon cancer (treated in remission), DM, HTN, lipids, CKD stage 3, was recently hospitalized from 10/20 to 10/24/2018 due to new onset aphasia and increased confusion.  CT done on 10/20 showed no stroke or neck vessel stenosis.Troponins were negative and showed low voltage QRS on EKG but the [facility] was consulted for changes on EEG but was determined that anti-seizure medications were not needed.  CT of the lung showed fibrotic and emphysematous changes.  She was found to have a UTI which was treated with fluids and keflex and showed  improvement. She was discharged on oral Keflex to end on 10/27/2018. Her hospitalization was complicated by hypomagnesia thought to be possibly to DM vs GI loss.  She was put on sitagliptin along with her home metformin to better manage sugars on prednisone.    She continues to make some progress with her aphasia with speech therapy.  Today, she is lying in bed and denies any pain or discomforts.  She denies any urinary issues with frequency, urgency, incontinence or burning.  She finished her antibiotic on 10/27.  Her BS are mostly controlled 100-180s with intermittent 200s at different times a day.  She states she is eating more food than usual. She is on sitagliptin only.  BPs are well controlled with SBPs 100-110s    Review of Systems   Patient denies fever, chills, headache, lightheadedness, dizziness, rhinorrhea, cough, congestion, shortness of breath, chest pain, palpitations, abdominal pain, n/v, diarrhea, constipation, change in appetite, dysuria, frequency, burning or pain with urination.  Otherwise review of systems are negative.         Physical Exam   Vital  signs: /59, HR 82, resp 18, 98.4 wt stable 123.2lbs  93% on RA  GENERAL APPEARANCE:eldery,well nourished, in no acute distress.  HEENT: normocephalic, atraumatic  PERRL, sclerae anicteric, conjunctivae clear and moist  NECK: Supple and symmetric. Trachea is midline, no thyromegaly, no adenopathy, and no tenderness  LUNGS: Lung sounds CTA, no adventitious sounds, respiratory effort normal.  CARD: RRR, S1, S2, without murmurs, gallops, rubs  ABD: Soft and nontender with normal bowel sounds.   MSK: Muscle strength and tone were normal.  EXTREMITIES: No cyanosis, clubbing or edema.  NEURO: Alert with some cognitive impairment. Normal affect.   SKIN: Inspection of the skin reveals no rashes, ulcerations or petechiae.  PSYCH: euthymic          Labs:  No results found for this or any previous visit (from the past 240 hour(s)).    Assessment:  1. Diabetes mellitus due to underlying condition with hyperosmolarity without coma, with long-term current use of insulin (H)     2. Expressive aphasia     3. UTI (urinary tract infection), bacterial     4. Essential hypertension         Plan:   DM: stable, monitor for continued BS in 200s may need to add another medication  Aphasia: improving continue with ST  UTI: resolved  HTN: stable continue with same medications    Greer CHANDLER CNP,saw and examined the patient and case discussed with Tatianna Mackenzie CNP.  Tatianna CHANDLER CNP, I examined the patient with Greer Garcias and discussed and agree with the medical care given.          Electronically signed by: Tatianna Mackenzie CNP

## 2021-06-21 NOTE — PROGRESS NOTES
Code Status:  FULL CODE  Visit Type: Problem Visit     Facility:  [facility] [[id number]]         Facility Type: SNF (Skilled Nursing Facility, TCU)    History of Present Illness: Paola Valdez is a 78 y.o. female seen today for follow-up on the TCU.  Patient with with hx of sarcoidosis, on chronic prednisone, hx of colon cancer DM, HTN, lipids, CKD stage 3, presents with acute aphashia, however over past 2 weeks weakness and intermit epoisodes of confusion as well.  Patient hospitalized on 10/20/2018 with acute confusional state with intermittent dysarthria.  MRI of the brain was negative for acute stroke however it did show chronic infarct in the cerebellar area.  EEG G results reviewed.  Neurology recommended no seizure medications.  Confusion did improve with IV antibiotics and IV fluids.  She was discharged on p.o. Keflex.  She did have a positive blood culture however it was thought that this could be contaminant for skin.  She was monitored for aspiration.  Serial troponins negative.  Magnesium was replaced.  She continues on aspirin and statin for stroke prophylaxis.  She does have underlying dementia.  Recent cataract surgery on the right eye on 9/24 on the left eye 10/8.      Patient sitting up in bedside chair.  She continues on speech therapy for expressive aphasia.  Pleasantly confused.  Blood sugar satisfactory controlled with oral anti-hyperglycemics.  She is steroid-dependent secondary to sarcoidosis.  No dysphagia.  Eating regular diet.              Active Ambulatory Problems     Diagnosis Date Noted     Hypertension      Diabetes mellitus (H)      Iron deficiency anemia due to chronic blood loss 01/06/2016     Sarcoidosis of lung (H)      Malnutrition of moderate degree (H) 01/13/2016     Colon cancer (H) 01/29/2016     Expressive aphasia 10/20/2018     Lactic acidosis 10/20/2018     Leukocytosis 10/20/2018     Acute confusion      Hypomagnesemia      UTI (urinary tract  infection), bacterial      COLLIN (acute kidney injury) (H)      Acute cystitis without hematuria      Resolved Ambulatory Problems     Diagnosis Date Noted     Hypokalemia 01/06/2016     Hyperglycemia 01/21/2016     Past Medical History:   Diagnosis Date     Cataracts, bilateral      Diabetes mellitus (H)      Gallstone      [place] (hard of hearing), bilateral      Hyperlipemia      Hypertension      Iron deficiency anemia secondary to blood loss (chronic)      Pneumonia      Sarcoidosis of lung (H)        Current Outpatient Medications   Medication Sig     acetaminophen (TYLENOL) 500 MG tablet Take 1 tablet (500 mg total) by mouth every 4 (four) hours as needed for pain or fever.     aspirin 81 MG EC tablet Take 1 tablet (81 mg total) by mouth daily.     escitalopram oxalate (LEXAPRO) 10 MG tablet Take 10 mg by mouth daily.     losartan (COZAAR) 50 MG tablet Take 50 mg by mouth daily. Indications: Hypertension     metFORMIN (GLUCOPHAGE) 500 MG tablet Take 1 tablet (500 mg total) by mouth 2 (two) times a day with meals.     metoprolol succinate (TOPROL-XL) 50 MG 24 hr tablet Take 100 mg by mouth daily.      predniSONE (DELTASONE) 20 MG tablet Take 20 mg by mouth every other day. For lungs     simvastatin (ZOCOR) 20 MG tablet Take 1 tablet (20 mg total) by mouth at bedtime.     sitaGLIPtin (JANUVIA) 50 MG tablet Take 1 tablet (50 mg total) by mouth every other day. Given along with prednisone     traZODone (DESYREL) 50 MG tablet Take 0.5 tablets (25 mg total) by mouth at bedtime as needed for sleep (Sundowning/agitation).       No Known Allergies      Review of Systems   Patient poor historian secondary to advanced dementia and expressive aphasia.  Most information obtained from nursing staff.    Physical Exam   PHYSICAL EXAMINATION:  Vital signs: /70 pulse rate 98, respirations 16, temperature 98.4, O2 sat 98% on room air.  Weight 122.6 pounds.  General: Awake, Alert, oriented x1, appropriately, follows simple  commands, quiet on exam.   HEENT:PERRLA, Pink conjunctiva, anicteric sclerae, moist oral mucosa.  Poor dentition.  Facial symmetry.  Tongue is midline.  NECK: Supple, without any lymphadenopathy, or masses  CVS:  S1  S2, without murmur or gallop.   LUNG: Clear to auscultation, No wheezes, rales or rhonci.  BACK: No kyphosis of the thoracic spine  ABDOMEN: Soft, nontender to palpation, with positive bowel sounds  EXTREMITIES: Good range of motion on both upper and lower extremities, no pedal edema, no calf tenderness.   are equal.  She is able to overcome gravity in all 4 extremities.  SKIN: Warm and dry, no rashes or erythema noted  NEUROLOGIC: Intact, pulses palpable  PSYCHIATRIC: Cognitive impairment with expressive aphasia.            Labs:    Labs reviewed in the record.    Assessment/Plan:  1. UTI (urinary tract infection), bacterial     2. Expressive aphasia     3. Cerebellar infarct (H)     4. Diabetes mellitus due to underlying condition with hyperosmolarity without coma, with long-term current use of insulin (H)     5. COLLIN (acute kidney injury) (H)     6. Sarcoidosis of lung (H)           Patient with recent hospitalization with UTI and old cerebellar infarct.  Patient with expressive aphasia.  She is pleasantly confused.  She continues on speech therapy.  She is eating regular diet.  Note is positive.  She is voiding without difficulty.  Diabetes satisfactory controlled with oral anti-hyperglycemics.  Acute kidney injury improved.  Sarcoidosis of the lung.  Lung sounds clear.  She is steroid-dependent.        Electronically signed by: Tatianna Mackenzie, HANK